# Patient Record
Sex: MALE | Race: BLACK OR AFRICAN AMERICAN | NOT HISPANIC OR LATINO | Employment: OTHER | URBAN - NONMETROPOLITAN AREA
[De-identification: names, ages, dates, MRNs, and addresses within clinical notes are randomized per-mention and may not be internally consistent; named-entity substitution may affect disease eponyms.]

---

## 2019-07-01 PROBLEM — R12 HEARTBURN: Status: ACTIVE | Noted: 2019-07-01

## 2019-07-01 PROBLEM — R79.89 ELEVATED LFTS: Status: ACTIVE | Noted: 2019-07-01

## 2019-07-01 PROBLEM — Z80.0 FAMILY HISTORY OF COLON CANCER: Status: ACTIVE | Noted: 2019-07-01

## 2019-07-18 PROBLEM — R10.9 ABDOMINAL PAIN: Status: ACTIVE | Noted: 2019-07-18

## 2021-10-25 DIAGNOSIS — R01.1 MURMUR, CARDIAC: Primary | ICD-10-CM

## 2021-10-26 DIAGNOSIS — R09.89 CAROTID BRUIT: Primary | ICD-10-CM

## 2021-10-27 ENCOUNTER — CLINICAL SUPPORT (OUTPATIENT)
Dept: CARDIOLOGY | Facility: HOSPITAL | Age: 66
End: 2021-10-27
Attending: NURSE PRACTITIONER
Payer: MEDICARE

## 2021-10-27 ENCOUNTER — HOSPITAL ENCOUNTER (OUTPATIENT)
Dept: RADIOLOGY | Facility: HOSPITAL | Age: 66
Discharge: HOME | End: 2021-10-27
Attending: NURSE PRACTITIONER
Payer: MEDICARE

## 2021-10-27 VITALS
SYSTOLIC BLOOD PRESSURE: 125 MMHG | BODY MASS INDEX: 24.5 KG/M2 | HEIGHT: 71 IN | WEIGHT: 175 LBS | DIASTOLIC BLOOD PRESSURE: 81 MMHG

## 2021-10-27 DIAGNOSIS — R09.89 CAROTID BRUIT: ICD-10-CM

## 2021-10-27 DIAGNOSIS — R01.1 MURMUR, CARDIAC: ICD-10-CM

## 2021-10-27 PROCEDURE — 93306 TTE W/DOPPLER COMPLETE: CPT

## 2021-10-27 PROCEDURE — 93880 EXTRACRANIAL BILAT STUDY: CPT | Mod: TC

## 2021-10-28 LAB
AORTIC ROOT ANNULUS: 2.38 CM
AORTIC VALVE CUSP SEPERATION: 1.48 CM
AV INDEX (PROSTH): 0.45
AV MEAN GRADIENT: 12 MMHG
AV PEAK GRADIENT: 21 MMHG
AV VALVE AREA: 1.51 CM2
AV VELOCITY RATIO: 0.38
BSA FOR ECHO PROCEDURE: 1.99 M2
CV ECHO LV RWT: 0.33 CM
DOP CALC AO PEAK VEL: 2.29 M/S
DOP CALC AO VTI: 43.01 CM
DOP CALC LVOT AREA: 3.4 CM2
DOP CALC LVOT DIAMETER: 2.08 CM
DOP CALC LVOT PEAK VEL: 0.86 M/S
DOP CALC LVOT STROKE VOLUME: 65.04 CM3
DOP CALCLVOT PEAK VEL VTI: 19.15 CM
E WAVE DECELERATION TIME: 186.24 MSEC
E/A RATIO: 1.1
ECHO LV POSTERIOR WALL: 0.96 CM (ref 0.6–1.1)
EJECTION FRACTION: 55 %
FRACTIONAL SHORTENING: 40 % (ref 28–44)
INTERVENTRICULAR SEPTUM: 0.95 CM (ref 0.6–1.1)
LEFT ATRIUM SIZE: 4.43 CM
LEFT INTERNAL DIMENSION IN SYSTOLE: 3.56 CM (ref 2.1–4)
LEFT VENTRICLE DIASTOLIC VOLUME INDEX: 86.65 ML/M2
LEFT VENTRICLE DIASTOLIC VOLUME: 172.44 ML
LEFT VENTRICLE MASS INDEX: 113 G/M2
LEFT VENTRICLE SYSTOLIC VOLUME INDEX: 26.7 ML/M2
LEFT VENTRICLE SYSTOLIC VOLUME: 53.13 ML
LEFT VENTRICULAR INTERNAL DIMENSION IN DIASTOLE: 5.89 CM (ref 3.5–6)
LEFT VENTRICULAR MASS: 225.42 G
MV PEAK A VEL: 1.04 M/S
MV PEAK E VEL: 1.14 M/S
MV STENOSIS PRESSURE HALF TIME: 54.01 MS
MV VALVE AREA P 1/2 METHOD: 4.07 CM2
PISA MRMAX VEL: 0.05 M/S
PISA TR MAX VEL: 2.47 M/S
PV PEAK VELOCITY: 0.65 CM/S
RA PRESSURE: 3 MMHG
RA WIDTH: 4 CM
RIGHT VENTRICULAR END-DIASTOLIC DIMENSION: 2.8 CM
TR MAX PG: 24 MMHG
TV REST PULMONARY ARTERY PRESSURE: 27 MMHG

## 2021-12-11 ENCOUNTER — HOSPITAL ENCOUNTER (INPATIENT)
Facility: HOSPITAL | Age: 66
LOS: 4 days | Discharge: HOME | DRG: 379 | End: 2021-12-15
Attending: EMERGENCY MEDICINE | Admitting: SURGERY
Payer: MEDICARE

## 2021-12-11 DIAGNOSIS — K57.90 DIVERTICULOSIS: ICD-10-CM

## 2021-12-11 DIAGNOSIS — K29.70 GASTRITIS, PRESENCE OF BLEEDING UNSPECIFIED, UNSPECIFIED CHRONICITY, UNSPECIFIED GASTRITIS TYPE: ICD-10-CM

## 2021-12-11 DIAGNOSIS — D64.9 ANEMIA, UNSPECIFIED TYPE: Primary | ICD-10-CM

## 2021-12-11 DIAGNOSIS — R53.1 WEAKNESS: ICD-10-CM

## 2021-12-11 DIAGNOSIS — D64.9 ANEMIA: ICD-10-CM

## 2021-12-11 DIAGNOSIS — R10.9 ABDOMINAL PAIN, UNSPECIFIED ABDOMINAL LOCATION: ICD-10-CM

## 2021-12-11 DIAGNOSIS — E87.6 HYPOKALEMIA: ICD-10-CM

## 2021-12-11 DIAGNOSIS — K31.7 GASTRIC POLYP: ICD-10-CM

## 2021-12-11 DIAGNOSIS — K92.2 GASTROINTESTINAL HEMORRHAGE, UNSPECIFIED GASTROINTESTINAL HEMORRHAGE TYPE: ICD-10-CM

## 2021-12-11 DIAGNOSIS — R01.1 MURMUR, HEART: ICD-10-CM

## 2021-12-11 LAB
ABO + RH BLD: NORMAL
ALBUMIN SERPL BCP-MCNC: 2.8 G/DL (ref 3.5–5.2)
ALP SERPL-CCNC: 39 U/L (ref 55–135)
ALT SERPL W/O P-5'-P-CCNC: 30 U/L (ref 10–44)
ANION GAP SERPL CALC-SCNC: 11 MMOL/L (ref 8–16)
APTT BLDCRRT: <21 SEC (ref 21–32)
AST SERPL-CCNC: 47 U/L (ref 10–40)
BASOPHILS # BLD AUTO: 0.01 K/UL (ref 0–0.2)
BASOPHILS NFR BLD: 0.2 % (ref 0–1.9)
BILIRUB SERPL-MCNC: 0.3 MG/DL (ref 0.1–1)
BILIRUB UR QL STRIP: NEGATIVE
BLD GP AB SCN CELLS X3 SERPL QL: NORMAL
BLD PROD TYP BPU: NORMAL
BLOOD UNIT EXPIRATION DATE: NORMAL
BLOOD UNIT TYPE CODE: 2800
BLOOD UNIT TYPE CODE: 9500
BLOOD UNIT TYPE: NORMAL
BUN SERPL-MCNC: 23 MG/DL (ref 8–23)
CALCIUM SERPL-MCNC: 8.5 MG/DL (ref 8.7–10.5)
CHLORIDE SERPL-SCNC: 101 MMOL/L (ref 95–110)
CLARITY UR: CLEAR
CO2 SERPL-SCNC: 29 MMOL/L (ref 23–29)
CODING SYSTEM: NORMAL
COLOR UR: YELLOW
CREAT SERPL-MCNC: 1.4 MG/DL (ref 0.5–1.4)
CTP QC/QA: YES
CTP QC/QA: YES
DIFFERENTIAL METHOD: ABNORMAL
DISPENSE STATUS: NORMAL
EOSINOPHIL # BLD AUTO: 0 K/UL (ref 0–0.5)
EOSINOPHIL NFR BLD: 0 % (ref 0–8)
ERYTHROCYTE [DISTWIDTH] IN BLOOD BY AUTOMATED COUNT: 14.6 % (ref 11.5–14.5)
EST. GFR  (AFRICAN AMERICAN): >60 ML/MIN/1.73 M^2
EST. GFR  (NON AFRICAN AMERICAN): 52.4 ML/MIN/1.73 M^2
GLUCOSE SERPL-MCNC: 140 MG/DL (ref 70–110)
GLUCOSE UR QL STRIP: NEGATIVE
HAPTOGLOB SERPL-MCNC: 106 MG/DL (ref 30–250)
HCT VFR BLD AUTO: 12.1 % (ref 40–54)
HCT VFR BLD AUTO: 20 % (ref 40–54)
HGB BLD-MCNC: 4.1 G/DL (ref 14–18)
HGB BLD-MCNC: 7 G/DL (ref 14–18)
HGB UR QL STRIP: NEGATIVE
IMM GRANULOCYTES # BLD AUTO: 0.01 K/UL (ref 0–0.04)
IMM GRANULOCYTES NFR BLD AUTO: 0.2 % (ref 0–0.5)
INR PPP: 1 (ref 0.8–1.2)
IRON SATN MFR SERPL: 7 % (ref 20–50)
IRON SERPL-MCNC: 27 UG/DL (ref 45–160)
KETONES UR QL STRIP: NEGATIVE
LACTATE SERPL-SCNC: 1.6 MMOL/L (ref 0.5–2.2)
LEUKOCYTE ESTERASE UR QL STRIP: NEGATIVE
LYMPHOCYTES # BLD AUTO: 0.9 K/UL (ref 1–4.8)
LYMPHOCYTES NFR BLD: 17.1 % (ref 18–48)
MAGNESIUM SERPL-MCNC: 1.7 MG/DL (ref 1.6–2.6)
MCH RBC QN AUTO: 29.3 PG (ref 27–31)
MCHC RBC AUTO-ENTMCNC: 33.9 G/DL (ref 32–36)
MCV RBC AUTO: 86 FL (ref 82–98)
MONOCYTES # BLD AUTO: 0.4 K/UL (ref 0.3–1)
MONOCYTES NFR BLD: 7.5 % (ref 4–15)
NEUTROPHILS # BLD AUTO: 3.8 K/UL (ref 1.8–7.7)
NEUTROPHILS NFR BLD: 75 % (ref 38–73)
NITRITE UR QL STRIP: NEGATIVE
NRBC BLD-RTO: 0 /100 WBC
NUM UNITS TRANS PACKED RBC: NORMAL
OB PNL STL: POSITIVE
PH UR STRIP: 8 [PH] (ref 5–8)
PLATELET # BLD AUTO: 206 K/UL (ref 150–450)
PMV BLD AUTO: 9.4 FL (ref 9.2–12.9)
POC MOLECULAR INFLUENZA A AGN: NEGATIVE
POC MOLECULAR INFLUENZA B AGN: NEGATIVE
POTASSIUM SERPL-SCNC: 2.6 MMOL/L (ref 3.5–5.1)
PROT SERPL-MCNC: 6.8 G/DL (ref 6–8.4)
PROT UR QL STRIP: NEGATIVE
PROTHROMBIN TIME: 10.8 SEC (ref 9–12.5)
RBC # BLD AUTO: 1.4 M/UL (ref 4.6–6.2)
RETICS/RBC NFR AUTO: 2.9 % (ref 0.4–2)
SARS-COV-2 RDRP RESP QL NAA+PROBE: NEGATIVE
SODIUM SERPL-SCNC: 141 MMOL/L (ref 136–145)
SP GR UR STRIP: 1.01 (ref 1–1.03)
TOTAL IRON BINDING CAPACITY: 367 UG/DL (ref 250–450)
TROPONIN I SERPL DL<=0.01 NG/ML-MCNC: 13.2 PG/ML (ref 0–60)
URN SPEC COLLECT METH UR: NORMAL
UROBILINOGEN UR STRIP-ACNC: NEGATIVE EU/DL
WBC # BLD AUTO: 5.04 K/UL (ref 3.9–12.7)

## 2021-12-11 PROCEDURE — 85014 HEMATOCRIT: CPT | Performed by: SURGERY

## 2021-12-11 PROCEDURE — U0002 COVID-19 LAB TEST NON-CDC: HCPCS | Performed by: EMERGENCY MEDICINE

## 2021-12-11 PROCEDURE — 25000003 PHARM REV CODE 250: Performed by: SURGERY

## 2021-12-11 PROCEDURE — 96374 THER/PROPH/DIAG INJ IV PUSH: CPT

## 2021-12-11 PROCEDURE — 86920 COMPATIBILITY TEST SPIN: CPT | Performed by: SURGERY

## 2021-12-11 PROCEDURE — 36415 COLL VENOUS BLD VENIPUNCTURE: CPT | Performed by: EMERGENCY MEDICINE

## 2021-12-11 PROCEDURE — 96361 HYDRATE IV INFUSION ADD-ON: CPT

## 2021-12-11 PROCEDURE — 25000003 PHARM REV CODE 250: Performed by: EMERGENCY MEDICINE

## 2021-12-11 PROCEDURE — 99291 CRITICAL CARE FIRST HOUR: CPT | Mod: 25

## 2021-12-11 PROCEDURE — 85018 HEMOGLOBIN: CPT | Performed by: SURGERY

## 2021-12-11 PROCEDURE — 85025 COMPLETE CBC W/AUTO DIFF WBC: CPT | Performed by: EMERGENCY MEDICINE

## 2021-12-11 PROCEDURE — 96375 TX/PRO/DX INJ NEW DRUG ADDON: CPT

## 2021-12-11 PROCEDURE — 82272 OCCULT BLD FECES 1-3 TESTS: CPT | Performed by: EMERGENCY MEDICINE

## 2021-12-11 PROCEDURE — 83735 ASSAY OF MAGNESIUM: CPT | Performed by: EMERGENCY MEDICINE

## 2021-12-11 PROCEDURE — 83540 ASSAY OF IRON: CPT | Performed by: SURGERY

## 2021-12-11 PROCEDURE — 83605 ASSAY OF LACTIC ACID: CPT | Performed by: EMERGENCY MEDICINE

## 2021-12-11 PROCEDURE — 84484 ASSAY OF TROPONIN QUANT: CPT | Performed by: EMERGENCY MEDICINE

## 2021-12-11 PROCEDURE — 36430 TRANSFUSION BLD/BLD COMPNT: CPT

## 2021-12-11 PROCEDURE — 86850 RBC ANTIBODY SCREEN: CPT | Performed by: EMERGENCY MEDICINE

## 2021-12-11 PROCEDURE — 93010 EKG 12-LEAD: ICD-10-PCS | Mod: ,,, | Performed by: INTERNAL MEDICINE

## 2021-12-11 PROCEDURE — 93010 ELECTROCARDIOGRAM REPORT: CPT | Mod: ,,, | Performed by: INTERNAL MEDICINE

## 2021-12-11 PROCEDURE — 88305 TISSUE EXAM BY PATHOLOGIST: CPT | Mod: TC

## 2021-12-11 PROCEDURE — 80053 COMPREHEN METABOLIC PANEL: CPT | Performed by: EMERGENCY MEDICINE

## 2021-12-11 PROCEDURE — 83010 ASSAY OF HAPTOGLOBIN QUANT: CPT | Performed by: SURGERY

## 2021-12-11 PROCEDURE — 11000001 HC ACUTE MED/SURG PRIVATE ROOM

## 2021-12-11 PROCEDURE — C9113 INJ PANTOPRAZOLE SODIUM, VIA: HCPCS | Performed by: SURGERY

## 2021-12-11 PROCEDURE — C9113 INJ PANTOPRAZOLE SODIUM, VIA: HCPCS | Performed by: EMERGENCY MEDICINE

## 2021-12-11 PROCEDURE — 63600175 PHARM REV CODE 636 W HCPCS: Performed by: SURGERY

## 2021-12-11 PROCEDURE — 63600175 PHARM REV CODE 636 W HCPCS: Performed by: EMERGENCY MEDICINE

## 2021-12-11 PROCEDURE — P9016 RBC LEUKOCYTES REDUCED: HCPCS | Performed by: EMERGENCY MEDICINE

## 2021-12-11 PROCEDURE — 81003 URINALYSIS AUTO W/O SCOPE: CPT | Performed by: EMERGENCY MEDICINE

## 2021-12-11 PROCEDURE — 85045 AUTOMATED RETICULOCYTE COUNT: CPT | Performed by: SURGERY

## 2021-12-11 PROCEDURE — 86920 COMPATIBILITY TEST SPIN: CPT | Performed by: EMERGENCY MEDICINE

## 2021-12-11 PROCEDURE — 36415 COLL VENOUS BLD VENIPUNCTURE: CPT | Performed by: SURGERY

## 2021-12-11 PROCEDURE — 93005 ELECTROCARDIOGRAM TRACING: CPT

## 2021-12-11 RX ORDER — POTASSIUM CHLORIDE 20 MEQ/1
40 TABLET, EXTENDED RELEASE ORAL
Status: COMPLETED | OUTPATIENT
Start: 2021-12-11 | End: 2021-12-11

## 2021-12-11 RX ORDER — MORPHINE SULFATE 2 MG/ML
2 INJECTION, SOLUTION INTRAMUSCULAR; INTRAVENOUS EVERY 4 HOURS PRN
Status: DISCONTINUED | OUTPATIENT
Start: 2021-12-11 | End: 2021-12-14

## 2021-12-11 RX ORDER — TALC
6 POWDER (GRAM) TOPICAL NIGHTLY PRN
Status: DISCONTINUED | OUTPATIENT
Start: 2021-12-11 | End: 2021-12-15 | Stop reason: HOSPADM

## 2021-12-11 RX ORDER — ATORVASTATIN CALCIUM 10 MG/1
10 TABLET, FILM COATED ORAL DAILY
COMMUNITY

## 2021-12-11 RX ORDER — ONDANSETRON 2 MG/ML
4 INJECTION INTRAMUSCULAR; INTRAVENOUS
Status: COMPLETED | OUTPATIENT
Start: 2021-12-11 | End: 2021-12-11

## 2021-12-11 RX ORDER — HYDROCODONE BITARTRATE AND ACETAMINOPHEN 500; 5 MG/1; MG/1
TABLET ORAL
Status: DISCONTINUED | OUTPATIENT
Start: 2021-12-11 | End: 2021-12-12

## 2021-12-11 RX ORDER — MORPHINE SULFATE 4 MG/ML
4 INJECTION, SOLUTION INTRAMUSCULAR; INTRAVENOUS EVERY 4 HOURS PRN
Status: DISCONTINUED | OUTPATIENT
Start: 2021-12-11 | End: 2021-12-14

## 2021-12-11 RX ORDER — ONDANSETRON 2 MG/ML
4 INJECTION INTRAMUSCULAR; INTRAVENOUS EVERY 8 HOURS PRN
Status: DISCONTINUED | OUTPATIENT
Start: 2021-12-11 | End: 2021-12-15 | Stop reason: HOSPADM

## 2021-12-11 RX ORDER — PANTOPRAZOLE SODIUM 40 MG/10ML
80 INJECTION, POWDER, LYOPHILIZED, FOR SOLUTION INTRAVENOUS
Status: COMPLETED | OUTPATIENT
Start: 2021-12-11 | End: 2021-12-11

## 2021-12-11 RX ORDER — SODIUM CHLORIDE 0.9 % (FLUSH) 0.9 %
10 SYRINGE (ML) INJECTION
Status: DISCONTINUED | OUTPATIENT
Start: 2021-12-11 | End: 2021-12-15 | Stop reason: HOSPADM

## 2021-12-11 RX ADMIN — POTASSIUM CHLORIDE 40 MEQ: 1500 TABLET, EXTENDED RELEASE ORAL at 08:12

## 2021-12-11 RX ADMIN — PANTOPRAZOLE SODIUM 8 MG/HR: 40 INJECTION, POWDER, FOR SOLUTION INTRAVENOUS at 10:12

## 2021-12-11 RX ADMIN — PANTOPRAZOLE SODIUM 8 MG/HR: 40 INJECTION, POWDER, FOR SOLUTION INTRAVENOUS at 07:12

## 2021-12-11 RX ADMIN — SODIUM CHLORIDE 1000 ML: 0.9 INJECTION, SOLUTION INTRAVENOUS at 08:12

## 2021-12-11 RX ADMIN — ONDANSETRON HYDROCHLORIDE 4 MG: 2 SOLUTION INTRAMUSCULAR; INTRAVENOUS at 08:12

## 2021-12-11 RX ADMIN — PANTOPRAZOLE SODIUM 8 MG/HR: 40 INJECTION, POWDER, FOR SOLUTION INTRAVENOUS at 09:12

## 2021-12-11 RX ADMIN — PANTOPRAZOLE SODIUM 80 MG: 40 INJECTION, POWDER, LYOPHILIZED, FOR SOLUTION INTRAVENOUS at 09:12

## 2021-12-11 NOTE — H&P
[facility]  General Surgery  History & Physical  12/11/21    Patient Name: Macho Diggs  MRN: [id number]  Admission Date: 12/11/2021  Attending Physician: Nona Tellez MD   Primary Care Provider: Seun Dodge NP    Patient information was obtained from patient, spouse/SO and ER records.     Subjective:     Chief Complaint/Reason for Admission:  Weakness and syncope    History of Present Illness:  Patient is a 66 y.o. male presents with weakness and what he feels is syncopal episode.  He reports for the last few weeks he has been feeling progressively more tired.  He was working outside and came inside to rest.  He went to the restroom and thinks he blacked out.  He denies any recent issues with bright red blood per rectum.  For the last few days, he has had dark stools.  He has never had an episode like this in the past.  He has had some reflux and indigestion issues in the past.  An upper endoscopy was unrevealing for any pathology per report.  He has some nausea with a couple episodes of emesis which was non bilious or bloody.  This was multiple years ago.  He also had a colonoscopy in approximately 2016 that was unremarkable.  He was evaluated by Cardiology in the past and home a.  And is currently undergoing a workup for murmur.  He has never been told he was anemia in the past.  No family history of sickle cell or any thalassemias.  No unintentional weight loss.  Family history of colon cancer in his brother and father.    No current facility-administered medications on file prior to encounter.     Current Outpatient Medications on File Prior to Encounter   Medication Sig    amlodipine (NORVASC) 10 MG tablet Take 1 tablet (10 mg total) by mouth once daily.    atorvastatin (LIPITOR) 10 MG tablet Take 10 mg by mouth once daily.    gabapentin (NEURONTIN) 300 MG capsule Take 1 capsule (300 mg total) by mouth 2 (two) times daily.    hydrochlorothiazide (HYDRODIURIL) 25 MG tablet Take 1 tablet  (25 mg total) by mouth once daily.    aspirin 81 MG Chew Take 81 mg by mouth once daily.    BYSTOLIC 10 mg Tab Take 10 mg by mouth once daily.    ciclopirox (LOPROX) 0.77 % Crea Apply a small amount to affected area twice a day PRN JOCK ITCH    desonide 0.05% (DESOWEN) 0.05 % Oint apply a small amount to affected area twice a day as needed for rash    hydrOXYzine HCl (ATARAX) 25 MG tablet Take 1 tablet (25 mg total) by mouth every evening. (Patient not taking: Reported on 2020)    meloxicam (MOBIC) 15 MG tablet Take 1 tablet (15 mg total) by mouth once daily. (Patient not taking: Reported on 2020)    pantoprazole (PROTONIX) 20 MG tablet Take 1 tablet (20 mg total) by mouth once daily. (Patient taking differently: Take 40 mg by mouth once daily. )    pravastatin (PRAVACHOL) 80 MG tablet Take 1 tablet (80 mg total) by mouth once daily. (Patient taking differently: Take 10 mg by mouth once daily. )       Review of patient's allergies indicates:  No Known Allergies    Past Medical History:   Diagnosis Date    Carpal tunnel syndrome     GERD (gastroesophageal reflux disease)     Heart murmur     Hyperlipidemia     Hypertension      Past Surgical History:   Procedure Laterality Date    COLONOSCOPY N/A 10/14/2016    Procedure: COLONOSCOPY;  Surgeon: Elizabet Jorgensen MD;  Location: [facility];  Service: Endoscopy;  Laterality: N/A;    ESOPHAGOGASTRODUODENOSCOPY N/A 2019    Procedure: ESOPHAGOGASTRODUODENOSCOPY (EGD);  Surgeon: Jocelyn Jorgensen MD;  Location: [facility];  Service: Endoscopy;  Laterality: N/A;    HERNIA REPAIR      TONSILLECTOMY       Family History     Problem Relation (Age of Onset)    Breast cancer Mother    Colon cancer Father, Brother        Tobacco Use    Smoking status: Former Smoker     Packs/day: 0.20     Years: 15.00     Pack years: 3.00     Types: Cigarettes     Start date: 10/12/1974     Quit date: 1985     Years since quittin.0    Smokeless  tobacco: Never Used   Substance and Sexual Activity    Alcohol use: Yes     Comment: 6*16 oz beers daily     Drug use: No    Sexual activity: Yes     Partners: Female     Review of Systems   Constitutional: Positive for activity change and fatigue. Negative for appetite change, chills, fever and unexpected weight change.   HENT: Negative for hearing loss, rhinorrhea, sinus pain, sneezing, sore throat and trouble swallowing.    Respiratory: Positive for shortness of breath. Negative for cough, choking, chest tightness, wheezing and stridor.    Cardiovascular: Negative for chest pain, palpitations and leg swelling.   Gastrointestinal: Positive for nausea and vomiting. Negative for abdominal pain, anal bleeding, blood in stool, constipation, diarrhea and rectal pain.   Genitourinary: Negative for difficulty urinating, flank pain and frequency.   Musculoskeletal: Positive for myalgias. Negative for arthralgias, gait problem and joint swelling.   Skin: Negative for color change, pallor and wound.   Neurological: Positive for dizziness, syncope, weakness and light-headedness. Negative for tremors, seizures, facial asymmetry, speech difficulty and numbness.   Psychiatric/Behavioral: Negative for agitation, behavioral problems and confusion. The patient is not nervous/anxious.    All other systems reviewed and are negative.    Objective:     Vital Signs (Most Recent):  Temp: 98.5 °F (36.9 °C) (12/11/21 1344)  Pulse: 81 (12/11/21 1344)  Resp: 20 (12/11/21 1344)  BP: (!) 157/90 (12/11/21 1344)  SpO2: 100 % (12/11/21 1344) Vital Signs (24h Range):  Temp:  [98.1 °F (36.7 °C)-98.5 °F (36.9 °C)] 98.5 °F (36.9 °C)  Pulse:  [67-87] 81  Resp:  [14-20] 20  SpO2:  [98 %-100 %] 100 %  BP: (111-157)/(66-90) 157/90     Weight: 74.8 kg (165 lb)  Body mass index is 23.01 kg/m².    Physical Exam  Vitals and nursing note reviewed.   Constitutional:       General: He is not in acute distress.     Appearance: Normal appearance. He is not  ill-appearing.   HENT:      Head: Normocephalic and atraumatic.      Nose: Nose normal.      Mouth/Throat:      Mouth: Mucous membranes are moist.      Pharynx: Oropharynx is clear.   Eyes:      General: No scleral icterus.     Extraocular Movements: Extraocular movements intact.      Conjunctiva/sclera: Conjunctivae normal.      Pupils: Pupils are equal, round, and reactive to light.   Cardiovascular:      Rate and Rhythm: Normal rate and regular rhythm.      Pulses: Normal pulses.      Heart sounds: Murmur (systolic) heard.   No friction rub. No gallop.    Pulmonary:      Effort: Pulmonary effort is normal. No respiratory distress.      Breath sounds: Normal breath sounds. No stridor. No wheezing, rhonchi or rales.   Abdominal:      General: Abdomen is flat. Bowel sounds are normal. There is no distension.      Palpations: Abdomen is soft.      Tenderness: There is no abdominal tenderness. There is no guarding or rebound.   Musculoskeletal:         General: No swelling or deformity. Normal range of motion.      Cervical back: Normal range of motion and neck supple.   Skin:     General: Skin is warm and dry.   Neurological:      General: No focal deficit present.      Mental Status: He is alert and oriented to person, place, and time. Mental status is at baseline.      Motor: No weakness.   Psychiatric:         Mood and Affect: Mood normal.         Behavior: Behavior normal.         Thought Content: Thought content normal.         Judgment: Judgment normal.         Significant Labs:  I have reviewed all pertinent lab results within the past 24 hours.  CBC:   Recent Labs   Lab 12/11/21  0806   WBC 5.04   RBC 1.40*   HGB 4.1*   HCT 12.1*      MCV 86   MCH 29.3   MCHC 33.9     BMP:   Recent Labs   Lab 12/11/21  0806   *      K 2.6*      CO2 29   BUN 23   CREATININE 1.4   CALCIUM 8.5*   MG 1.7     CMP:   Recent Labs   Lab 12/11/21  0806   *   CALCIUM 8.5*   ALBUMIN 2.8*   PROT 6.8   NA  141   K 2.6*   CO2 29      BUN 23   CREATININE 1.4   ALKPHOS 39*   ALT 30   AST 47*   BILITOT 0.3       Significant Diagnostics:  I have reviewed all pertinent imaging results/findings within the past 24 hours.    Assessment/Plan:     Active Diagnoses:    Diagnosis Date Noted POA    PRINCIPAL PROBLEM:  Anemia [D64.9] 12/11/2021 Yes      Transfuse  PPI drip  Cardiology consult  Endoscopy once medically stable     VTE Risk Mitigation (From admission, onward)         Ordered     IP VTE LOW RISK PATIENT  Once         12/11/21 1157     Place SHERRELL hose  Until discontinued         12/11/21 1157                Nona Tellez MD  General Surgery  [facility]

## 2021-12-11 NOTE — ED PROVIDER NOTES
"Encounter Date: 2021       History     Chief Complaint   Patient presents with    Weakness     Pt c/o weakness, "weak in my legs".  Pt states vomiting and throbbing in head     66-year-old male presents with weakness.  He states symptoms started yesterday.  Patient states that his legs feel weak is felt dizzy.  He has complained of nausea and a few episodes of vomiting.  She denies any diarrhea.  He denies any fever.  He denies any chest pain, cough, congestion, shortness of breath.  He does complain of a headache.  He denies similar episodes in the past.        Review of patient's allergies indicates:  No Known Allergies  Past Medical History:   Diagnosis Date    Carpal tunnel syndrome     GERD (gastroesophageal reflux disease)     Heart murmur     Hyperlipidemia     Hypertension      Past Surgical History:   Procedure Laterality Date    COLONOSCOPY N/A 10/14/2016    Procedure: COLONOSCOPY;  Surgeon: Elizabet Jorgensen MD;  [facility];  Service: Endoscopy;  Laterality: N/A;    ESOPHAGOGASTRODUODENOSCOPY N/A 2019    Procedure: ESOPHAGOGASTRODUODENOSCOPY (EGD);  Surgeon: Jocelyn Jorgensen MD;  [facility];  Service: Endoscopy;  Laterality: N/A;    HERNIA REPAIR      TONSILLECTOMY       Family History   Problem Relation Age of Onset    Breast cancer Mother     Colon cancer Father     Colon cancer Brother      Social History     Tobacco Use    Smoking status: Former Smoker     Packs/day: 0.20     Years: 11.00     Pack years: 2.20     Types: Cigarettes     Start date: 10/12/1974     Quit date: 1985     Years since quittin.0    Smokeless tobacco: Never Used   Substance Use Topics    Alcohol use: Yes     Comment: 6*16 oz beers daily     Drug use: No     Review of Systems   Gastrointestinal: Positive for nausea and vomiting.   Neurological: Positive for weakness and headaches.   All other systems reviewed and are negative.      Physical Exam     Initial Vitals " [12/11/21 0731]   BP Pulse Resp Temp SpO2   111/68 87 14 98.4 °F (36.9 °C) 100 %      MAP       --         Physical Exam    Nursing note and vitals reviewed.  Constitutional: He appears well-developed and well-nourished. No distress.   HENT:   Head: Atraumatic.   Mouth/Throat: Oropharynx is clear and moist.   Eyes: EOM are normal. Pupils are equal, round, and reactive to light.   Neck: Neck supple.   Cardiovascular: Normal rate, regular rhythm and normal heart sounds.   Pulmonary/Chest: Breath sounds normal. No respiratory distress.   Abdominal: Abdomen is soft. There is no abdominal tenderness.   Musculoskeletal:      Cervical back: Neck supple.     Neurological: He is alert and oriented to person, place, and time.   Skin: Skin is warm and dry.         ED Course   Critical Care    Date/Time: 12/11/2021 9:46 AM  Performed by: Damian Lucia MD  Authorized by: Damian Lucia MD   Direct patient critical care time: 10 minutes  Ordering / reviewing critical care time: 10 minutes  Documentation critical care time: 10 minutes  Consulting other physicians critical care time: 5 minutes  Total critical care time (exclusive of procedural time) : 35 minutes        Labs Reviewed   COMPREHENSIVE METABOLIC PANEL - Abnormal; Notable for the following components:       Result Value    Potassium 2.6 (*)     Glucose 140 (*)     Calcium 8.5 (*)     Albumin 2.8 (*)     Alkaline Phosphatase 39 (*)     AST 47 (*)     eGFR if non  52.4 (*)     All other components within normal limits    Narrative:     Called to and read back by Pam by YASMINE 12/11/2021 08:36   CBC W/ AUTO DIFFERENTIAL - Abnormal; Notable for the following components:    RBC 1.40 (*)     Hemoglobin 4.1 (*)     Hematocrit 12.1 (*)     RDW 14.6 (*)     Lymph # 0.9 (*)     Gran % 75.0 (*)     Lymph % 17.1 (*)     All other components within normal limits    Narrative:       HGB/HCT critical result(s) called and verbal readback obtained from   Enedina  Karl)  by SB3 12/11/2021 08:30   OCCULT BLOOD X 1, STOOL - Abnormal; Notable for the following components:    Occult Blood Positive (*)     All other components within normal limits   URINALYSIS, REFLEX TO URINE CULTURE    Narrative:     Preferred Collection Type->Urine, Clean Catch  Specimen Source->Urine   LACTIC ACID, PLASMA   MAGNESIUM   TROPONIN I HIGH SENSITIVITY   APTT   PROTIME-INR   SARS-COV-2 RDRP GENE    Narrative:     This test utilizes isothermal nucleic acid amplification   technology to detect the SARS-CoV-2 RdRp nucleic acid segment.   The analytical sensitivity (limit of detection) is 125 genome   equivalents/mL.   A POSITIVE result implies infection with the SARS-CoV-2 virus;   the patient is presumed to be contagious.     A NEGATIVE result means that SARS-CoV-2 nucleic acids are not   present above the limit of detection. A NEGATIVE result should be   treated as presumptive. It does not rule out the possibility of   COVID-19 and should not be the sole basis for treatment decisions.   If COVID-19 is strongly suspected based on clinical and exposure   history, re-testing using an alternate molecular assay should be   considered.   This test is only for use under the Food and Drug   Administration s Emergency Use Authorization (EUA).   Commercial kits are provided by [employer] [facility].   Performance characteristics of the EUA have been independently   verified by [facility] Department of   Pathology and Laboratory Medicine.   _________________________________________________________________   The authorized Fact Sheet for Healthcare Providers and the authorized Fact   Sheet for Patients of the ID NOW COVID-19 are available on the FDA   website:     https://www.fda.gov/media/942136/download  https://www.fda.gov/media/320933/download         POCT INFLUENZA A/B MOLECULAR   TYPE & SCREEN (MANUAL)   PREPARE RBC SOFT   PREPARE RBC SOFT          Imaging Results    None           Medications   0.9%  NaCl infusion (for blood administration) (has no administration in time range)   0.9%  NaCl infusion (for blood administration) (has no administration in time range)   sodium chloride 0.9% bolus 1,000 mL (0 mLs Intravenous Stopped 12/11/21 0900)   ondansetron injection 4 mg (4 mg Intravenous Given 12/11/21 0800)   potassium chloride SA CR tablet 40 mEq (40 mEq Oral Given 12/11/21 0854)   pantoprazole injection 80 mg (80 mg Intravenous Given 12/11/21 0946)   pantoprazole 40 mg in dextrose 5 % 100 mL infusion (ready to mix system) (8 mg/hr Intravenous New Bag 12/11/21 0946)     Medical Decision Making:   ED Management:  Afebrile vital signs stable.  Patient found to be severely anemic.  He does note that he has had dark stool for the past several days.  He had negative upper GI in 2019 and negative colonoscopy in 2016. He is being typed match 4 units of blood will be transfused.  He is being admitted to Dr. Tellez who was see the patient later today                       Clinical Impression:   Final diagnoses:  [R53.1] Weakness  [D64.9] Anemia, unspecified type (Primary)  [K92.2] Gastrointestinal hemorrhage, unspecified gastrointestinal hemorrhage type          ED Disposition Condition    Admit               Damian Lucia MD  12/11/21 0948

## 2021-12-11 NOTE — PLAN OF CARE
Problem: Adult Inpatient Plan of Care  Goal: Plan of Care Review  Outcome: Ongoing, Progressing  Goal: Patient-Specific Goal (Individualized)  Outcome: Ongoing, Progressing  Goal: Absence of Hospital-Acquired Illness or Injury  Outcome: Ongoing, Progressing  Goal: Optimal Comfort and Wellbeing  Outcome: Ongoing, Progressing  Goal: Readiness for Transition of Care  Outcome: Ongoing, Progressing   Will continue to monitor and will provide a safe environment

## 2021-12-11 NOTE — PLAN OF CARE
12/11/21 1351   Medicare Message   Important Message from Medicare regarding Discharge Appeal Rights Other (comments)  (Obtained by registration staff. No time documented.)   Date IMM was signed 12/11/21

## 2021-12-11 NOTE — PLAN OF CARE
Problem: Adult Inpatient Plan of Care  Goal: Plan of Care Review  Outcome: Ongoing, Progressing  Goal: Patient-Specific Goal (Individualized)  Outcome: Ongoing, Progressing  Goal: Absence of Hospital-Acquired Illness or Injury  Outcome: Ongoing, Progressing  Goal: Optimal Comfort and Wellbeing  Outcome: Ongoing, Progressing  Goal: Readiness for Transition of Care  Outcome: Ongoing, Progressing   Will continue to monitor and will maintain a safe environment

## 2021-12-11 NOTE — NURSING
Pt. Arrived at the hospital this morning due to weakness and dizzyness. Reports feeling like this since yesterday. History of hypertension and high cholesterol. Stool specimen was positive for occult blood. Pt was started on protonics in ER and blood was started due to a low Hemoglobin and hematocrit . Pt is currently getting a blood transfusion.. Pt has a 20 g in his right FA and a 18 g in the left FA. Pt is being admitted to Room 634 to the service of Dr. Singleton. Pt reports that he may have passed out yesterday ( states I was standing up and suddenly I found myself sitting down)   Will continue with this admission and will provide a safe environment. Fall precautions maintained. Pt was shown the location of his call bell and was instructed to buzz or call for any problems

## 2021-12-12 PROBLEM — R01.1 MURMUR, HEART: Status: ACTIVE | Noted: 2021-12-12

## 2021-12-12 PROBLEM — E87.6 HYPOKALEMIA: Status: ACTIVE | Noted: 2021-12-12

## 2021-12-12 LAB
ANION GAP SERPL CALC-SCNC: 8 MMOL/L (ref 8–16)
BASOPHILS # BLD AUTO: 0.01 K/UL (ref 0–0.2)
BASOPHILS NFR BLD: 0.2 % (ref 0–1.9)
BUN SERPL-MCNC: 18 MG/DL (ref 8–23)
CALCIUM SERPL-MCNC: 8.6 MG/DL (ref 8.7–10.5)
CHLORIDE SERPL-SCNC: 105 MMOL/L (ref 95–110)
CO2 SERPL-SCNC: 28 MMOL/L (ref 23–29)
CREAT SERPL-MCNC: 1.2 MG/DL (ref 0.5–1.4)
DIFFERENTIAL METHOD: ABNORMAL
EOSINOPHIL # BLD AUTO: 0 K/UL (ref 0–0.5)
EOSINOPHIL NFR BLD: 0.2 % (ref 0–8)
ERYTHROCYTE [DISTWIDTH] IN BLOOD BY AUTOMATED COUNT: 15.4 % (ref 11.5–14.5)
EST. GFR  (AFRICAN AMERICAN): >60 ML/MIN/1.73 M^2
EST. GFR  (NON AFRICAN AMERICAN): >60 ML/MIN/1.73 M^2
GLUCOSE SERPL-MCNC: 99 MG/DL (ref 70–110)
HCT VFR BLD AUTO: 24.7 % (ref 40–54)
HGB BLD-MCNC: 8.5 G/DL (ref 14–18)
IMM GRANULOCYTES # BLD AUTO: 0.01 K/UL (ref 0–0.04)
IMM GRANULOCYTES NFR BLD AUTO: 0.2 % (ref 0–0.5)
LYMPHOCYTES # BLD AUTO: 1.2 K/UL (ref 1–4.8)
LYMPHOCYTES NFR BLD: 19.1 % (ref 18–48)
MCH RBC QN AUTO: 30.5 PG (ref 27–31)
MCHC RBC AUTO-ENTMCNC: 34.4 G/DL (ref 32–36)
MCV RBC AUTO: 89 FL (ref 82–98)
MONOCYTES # BLD AUTO: 0.5 K/UL (ref 0.3–1)
MONOCYTES NFR BLD: 8.1 % (ref 4–15)
NEUTROPHILS # BLD AUTO: 4.5 K/UL (ref 1.8–7.7)
NEUTROPHILS NFR BLD: 72.2 % (ref 38–73)
NRBC BLD-RTO: 0 /100 WBC
PLATELET # BLD AUTO: 220 K/UL (ref 150–450)
PMV BLD AUTO: 9.7 FL (ref 9.2–12.9)
POTASSIUM SERPL-SCNC: 2.8 MMOL/L (ref 3.5–5.1)
RBC # BLD AUTO: 2.79 M/UL (ref 4.6–6.2)
SODIUM SERPL-SCNC: 141 MMOL/L (ref 136–145)
WBC # BLD AUTO: 6.18 K/UL (ref 3.9–12.7)

## 2021-12-12 PROCEDURE — 36430 TRANSFUSION BLD/BLD COMPNT: CPT

## 2021-12-12 PROCEDURE — 36415 COLL VENOUS BLD VENIPUNCTURE: CPT | Performed by: EMERGENCY MEDICINE

## 2021-12-12 PROCEDURE — 11000001 HC ACUTE MED/SURG PRIVATE ROOM

## 2021-12-12 PROCEDURE — 80048 BASIC METABOLIC PNL TOTAL CA: CPT | Performed by: EMERGENCY MEDICINE

## 2021-12-12 PROCEDURE — 25000003 PHARM REV CODE 250: Performed by: SURGERY

## 2021-12-12 PROCEDURE — 85025 COMPLETE CBC W/AUTO DIFF WBC: CPT | Performed by: EMERGENCY MEDICINE

## 2021-12-12 RX ORDER — LANOLIN ALCOHOL/MO/W.PET/CERES
400 CREAM (GRAM) TOPICAL ONCE
Status: COMPLETED | OUTPATIENT
Start: 2021-12-12 | End: 2021-12-12

## 2021-12-12 RX ORDER — SUCRALFATE 1 G/1
1 TABLET ORAL
Status: DISCONTINUED | OUTPATIENT
Start: 2021-12-12 | End: 2021-12-15 | Stop reason: HOSPADM

## 2021-12-12 RX ORDER — SODIUM, POTASSIUM,MAG SULFATES 17.5-3.13G
1 SOLUTION, RECONSTITUTED, ORAL ORAL ONCE
Status: DISCONTINUED | OUTPATIENT
Start: 2021-12-13 | End: 2021-12-12

## 2021-12-12 RX ORDER — POTASSIUM CHLORIDE 20 MEQ/1
40 TABLET, EXTENDED RELEASE ORAL 2 TIMES DAILY
Status: COMPLETED | OUTPATIENT
Start: 2021-12-12 | End: 2021-12-13

## 2021-12-12 RX ADMIN — POTASSIUM CHLORIDE 40 MEQ: 1500 TABLET, EXTENDED RELEASE ORAL at 09:12

## 2021-12-12 RX ADMIN — SUCRALFATE 1 G: 1 TABLET ORAL at 09:12

## 2021-12-12 RX ADMIN — SUCRALFATE 1 G: 1 TABLET ORAL at 05:12

## 2021-12-12 RX ADMIN — Medication 400 MG: at 05:12

## 2021-12-12 NOTE — [facility] SECONDARY REVIEW
Physician Advisor External    PA - Medical Necessity Issue    Approved Inpatient     Sent to [employer] for secondary review. Inpatient level of care approved.

## 2021-12-12 NOTE — PROGRESS NOTES
[facility]  General Surgery  Progress Note  12/12/21    Subjective:     History of Present Illness:  No notes on file    Post-Op Info:  Procedure(s) (LRB):  EGD (ESOPHAGOGASTRODUODENOSCOPY) (N/A)  COLONOSCOPY (N/A)         Interval History: No new issues.  Still dark stools.  No nausea or vomiting.  No abdominal pain.  No palpitations.      Medications:  Continuous Infusions:   pantoprozole (PROTONIX) IV infusion 8 mg/hr (12/11/21 2205)     Scheduled Meds:   [START ON 12/13/2021] sodium,potassium,mag sulfates  1 kit Oral Once    sucralfate  1 g Oral QID (AC & HS)     PRN Meds:melatonin, morphine, morphine, ondansetron, sodium chloride 0.9%     Review of patient's allergies indicates:  No Known Allergies  Objective:     Vital Signs (Most Recent):  Temp: 98.3 °F (36.8 °C) (12/12/21 1100)  Pulse: (P) 72 (12/12/21 1400)  Resp: 18 (12/12/21 1100)  BP: (!) 137/90 (12/12/21 1100)  SpO2: 99 % (12/12/21 1100) Vital Signs (24h Range):  Temp:  [97.9 °F (36.6 °C)-99 °F (37.2 °C)] 98.3 °F (36.8 °C)  Pulse:  [61-95] (P) 72  Resp:  [16-20] 18  SpO2:  [97 %-100 %] 99 %  BP: (112-156)/(65-90) 137/90     Weight: 74.8 kg (165 lb)  Body mass index is 23.01 kg/m².    Intake/Output - Last 3 Shifts       12/10 0700  12/11 0659 12/11 0700  12/12 0659 12/12 0700  12/13 0659    P.O.  300     I.V. (mL/kg)  400 (5.3)     Blood  1414     IV Piggyback  999     Total Intake(mL/kg)  3113 (41.6)     Urine (mL/kg/hr)  1000     Stool  0     Total Output  1000     Net  +2113            Stool Occurrence  2 x           Physical Exam  Vitals and nursing note reviewed.   Constitutional:       General: He is not in acute distress.     Appearance: Normal appearance. He is obese. He is not ill-appearing.   HENT:      Head: Normocephalic and atraumatic.      Nose: Nose normal.      Mouth/Throat:      Mouth: Mucous membranes are moist.      Pharynx: Oropharynx is clear.   Eyes:      General: No scleral icterus.     Extraocular Movements:  Extraocular movements intact.      Conjunctiva/sclera: Conjunctivae normal.      Pupils: Pupils are equal, round, and reactive to light.   Cardiovascular:      Rate and Rhythm: Normal rate and regular rhythm.      Pulses: Normal pulses.      Heart sounds: Normal heart sounds. No murmur heard.  No gallop.    Pulmonary:      Effort: Pulmonary effort is normal. No respiratory distress.      Breath sounds: Normal breath sounds. No stridor. No wheezing, rhonchi or rales.   Abdominal:      General: Abdomen is flat. Bowel sounds are normal. There is no distension.      Tenderness: There is no abdominal tenderness. There is no guarding.   Musculoskeletal:         General: No swelling. Normal range of motion.      Cervical back: Normal range of motion and neck supple.   Skin:     General: Skin is warm and dry.   Neurological:      General: No focal deficit present.      Mental Status: He is alert and oriented to person, place, and time. Mental status is at baseline.   Psychiatric:         Mood and Affect: Mood normal.         Behavior: Behavior normal.         Thought Content: Thought content normal.         Judgment: Judgment normal.         Significant Labs:  I have reviewed all pertinent lab results within the past 24 hours.  CBC:   Recent Labs   Lab 12/12/21  0538   WBC 6.18   RBC 2.79*   HGB 8.5*   HCT 24.7*      MCV 89   MCH 30.5   MCHC 34.4     BMP:   Recent Labs   Lab 12/11/21  0806 12/11/21  0806 12/12/21  0538   *   < > 99      < > 141   K 2.6*   < > 2.8*      < > 105   CO2 29   < > 28   BUN 23   < > 18   CREATININE 1.4   < > 1.2   CALCIUM 8.5*   < > 8.6*   MG 1.7  --   --     < > = values in this interval not displayed.     CMP:   Recent Labs   Lab 12/11/21  0806 12/11/21  0806 12/12/21  0538   *   < > 99   CALCIUM 8.5*   < > 8.6*   ALBUMIN 2.8*  --   --    PROT 6.8  --   --       < > 141   K 2.6*   < > 2.8*   CO2 29   < > 28      < > 105   BUN 23   < > 18   CREATININE  1.4   < > 1.2   ALKPHOS 39*  --   --    ALT 30  --   --    AST 47*  --   --    BILITOT 0.3  --   --     < > = values in this interval not displayed.       Significant Diagnostics:  I have reviewed all pertinent imaging results/findings within the past 24 hours.    Assessment/Plan:     * Anemia  Improved after transfusion.   Still melanotic stools?  Monitor H/H    Hypokalemia  Replace potassium    Murmur, heart  Awaiting cardiac eval        Nona Tellez MD  General Surgery  [facility]

## 2021-12-12 NOTE — PLAN OF CARE
[facility]  Initial Discharge Assessment       Primary Care Provider: Seun Dodeg NP    Admission Diagnosis: Weakness [R53.1]  Gastrointestinal hemorrhage, unspecified gastrointestinal hemorrhage type [K92.2]  Anemia, unspecified type [D64.9]    Admission Date: 12/11/2021  Expected Discharge Date:     Discharge Barriers Identified: None    Payor: [employer]ARE / Plan: [employer] / Product Type: Medicare Advantage /     Extended Emergency Contact Information  Primary Emergency Contact: Abbey Diggs   [place]  Mobile Phone: [phone]  Relation: Spouse    Discharge Plan A: Home with family  Discharge Plan B: Home with family      [facility] [address] [place], [place] [address]  [address]  [facility] [address]  Phone: [phone] Fax: [phone]    [facility] [address] [address]  Phone: [phone] Fax: [phone]    GIRISH SALCEDO [facility] [address]  [address]  [facility] [address]  Phone: [phone] Fax: [phone]      Initial Assessment (most recent)     Adult Discharge Assessment - 12/12/21 1134        Discharge Assessment    Assessment Type Discharge Planning Assessment     Confirmed/corrected address, phone number and insurance Yes     Confirmed Demographics Correct on Facesheet     Source of Information patient;family   Patient and spouse.    Communicated YOUSUF with patient/caregiver Date not available/Unable to determine     Reason For Admission Gi bleed     Lives With spouse     Do you expect to return to your current living situation? Yes     Do you have help at home or someone to help you manage your care at home? Yes     Who are your caregiver(s) and their phone number(s)? Patient is independent at home and does not need a caregiver but has support from spouse if needed.     Prior to hospitilization cognitive status:  Alert/Oriented     Current cognitive status: Alert/Oriented     Walking or Climbing Stairs Difficulty none     Dressing/Bathing Difficulty none     Home Accessibility stairs to enter home     Stairs Comment, Main Entrance No problems ambulating on stairs.     Equipment Currently Used at Home none     Readmission within 30 days? No     Patient currently being followed by outpatient case management? No     Do you currently have service(s) that help you manage your care at home? No     Do you take prescription medications? Yes     Do you have prescription coverage? Yes     Do you have any problems affording any of your prescribed medications? No     Is the patient taking medications as prescribed? yes     Who is going to help you get home at discharge? Spouse     How do you get to doctors appointments? car, drives self;family or friend will provide     Are you on dialysis? No     Discharge Plan A Home with family     Discharge Plan B Home with family     DME Needed Upon Discharge  none     Discharge Plan discussed with: Patient;Spouse/sig other     Discharge Barriers Identified None                      Spoke to the patient and spouse at bedside. Patient is independent at home. Still drives. No discharge needs.

## 2021-12-12 NOTE — NURSING
Pt noted to be alert and oriented, no distress. Verbally responsive and able to make needs known. Pt denies any pain, care plan reviewed. Will continue to monitor.

## 2021-12-12 NOTE — SUBJECTIVE & OBJECTIVE
Interval History: No new issues.  Still dark stools.  No nausea or vomiting.  No abdominal pain.  No palpitations.      Medications:  Continuous Infusions:   pantoprozole (PROTONIX) IV infusion 8 mg/hr (12/11/21 2205)     Scheduled Meds:   [START ON 12/13/2021] sodium,potassium,mag sulfates  1 kit Oral Once    sucralfate  1 g Oral QID (AC & HS)     PRN Meds:melatonin, morphine, morphine, ondansetron, sodium chloride 0.9%     Review of patient's allergies indicates:  No Known Allergies  Objective:     Vital Signs (Most Recent):  Temp: 98.3 °F (36.8 °C) (12/12/21 1100)  Pulse: (P) 72 (12/12/21 1400)  Resp: 18 (12/12/21 1100)  BP: (!) 137/90 (12/12/21 1100)  SpO2: 99 % (12/12/21 1100) Vital Signs (24h Range):  Temp:  [97.9 °F (36.6 °C)-99 °F (37.2 °C)] 98.3 °F (36.8 °C)  Pulse:  [61-95] (P) 72  Resp:  [16-20] 18  SpO2:  [97 %-100 %] 99 %  BP: (112-156)/(65-90) 137/90     Weight: 74.8 kg (165 lb)  Body mass index is 23.01 kg/m².    Intake/Output - Last 3 Shifts       12/10 0700  12/11 0659 12/11 0700  12/12 0659 12/12 0700  12/13 0659    P.O.  300     I.V. (mL/kg)  400 (5.3)     Blood  1414     IV Piggyback  999     Total Intake(mL/kg)  3113 (41.6)     Urine (mL/kg/hr)  1000     Stool  0     Total Output  1000     Net  +2113            Stool Occurrence  2 x           Physical Exam  Vitals and nursing note reviewed.   Constitutional:       General: He is not in acute distress.     Appearance: Normal appearance. He is obese. He is not ill-appearing.   HENT:      Head: Normocephalic and atraumatic.      Nose: Nose normal.      Mouth/Throat:      Mouth: Mucous membranes are moist.      Pharynx: Oropharynx is clear.   Eyes:      General: No scleral icterus.     Extraocular Movements: Extraocular movements intact.      Conjunctiva/sclera: Conjunctivae normal.      Pupils: Pupils are equal, round, and reactive to light.   Cardiovascular:      Rate and Rhythm: Normal rate and regular rhythm.      Pulses: Normal pulses.       Heart sounds: Normal heart sounds. No murmur heard.  No gallop.    Pulmonary:      Effort: Pulmonary effort is normal. No respiratory distress.      Breath sounds: Normal breath sounds. No stridor. No wheezing, rhonchi or rales.   Abdominal:      General: Abdomen is flat. Bowel sounds are normal. There is no distension.      Tenderness: There is no abdominal tenderness. There is no guarding.   Musculoskeletal:         General: No swelling. Normal range of motion.      Cervical back: Normal range of motion and neck supple.   Skin:     General: Skin is warm and dry.   Neurological:      General: No focal deficit present.      Mental Status: He is alert and oriented to person, place, and time. Mental status is at baseline.   Psychiatric:         Mood and Affect: Mood normal.         Behavior: Behavior normal.         Thought Content: Thought content normal.         Judgment: Judgment normal.         Significant Labs:  I have reviewed all pertinent lab results within the past 24 hours.  CBC:   Recent Labs   Lab 12/12/21  0538   WBC 6.18   RBC 2.79*   HGB 8.5*   HCT 24.7*      MCV 89   MCH 30.5   MCHC 34.4     BMP:   Recent Labs   Lab 12/11/21  0806 12/11/21  0806 12/12/21  0538   *   < > 99      < > 141   K 2.6*   < > 2.8*      < > 105   CO2 29   < > 28   BUN 23   < > 18   CREATININE 1.4   < > 1.2   CALCIUM 8.5*   < > 8.6*   MG 1.7  --   --     < > = values in this interval not displayed.     CMP:   Recent Labs   Lab 12/11/21  0806 12/11/21  0806 12/12/21  0538   *   < > 99   CALCIUM 8.5*   < > 8.6*   ALBUMIN 2.8*  --   --    PROT 6.8  --   --       < > 141   K 2.6*   < > 2.8*   CO2 29   < > 28      < > 105   BUN 23   < > 18   CREATININE 1.4   < > 1.2   ALKPHOS 39*  --   --    ALT 30  --   --    AST 47*  --   --    BILITOT 0.3  --   --     < > = values in this interval not displayed.       Significant Diagnostics:  I have reviewed all pertinent imaging results/findings within  the past 24 hours.

## 2021-12-13 ENCOUNTER — ANESTHESIA EVENT (OUTPATIENT)
Dept: ENDOSCOPY | Facility: HOSPITAL | Age: 66
DRG: 379 | End: 2021-12-13
Payer: MEDICARE

## 2021-12-13 LAB
ANION GAP SERPL CALC-SCNC: 4 MMOL/L (ref 8–16)
BASOPHILS # BLD AUTO: 0.02 K/UL (ref 0–0.2)
BASOPHILS NFR BLD: 0.4 % (ref 0–1.9)
BLD PROD TYP BPU: NORMAL
BLOOD UNIT EXPIRATION DATE: NORMAL
BLOOD UNIT TYPE CODE: 600
BLOOD UNIT TYPE: NORMAL
BUN SERPL-MCNC: 17 MG/DL (ref 8–23)
CALCIUM SERPL-MCNC: 8.7 MG/DL (ref 8.7–10.5)
CHLORIDE SERPL-SCNC: 108 MMOL/L (ref 95–110)
CO2 SERPL-SCNC: 29 MMOL/L (ref 23–29)
CODING SYSTEM: NORMAL
CREAT SERPL-MCNC: 1.2 MG/DL (ref 0.5–1.4)
DIFFERENTIAL METHOD: ABNORMAL
DISPENSE STATUS: NORMAL
EOSINOPHIL # BLD AUTO: 0.1 K/UL (ref 0–0.5)
EOSINOPHIL NFR BLD: 1.3 % (ref 0–8)
ERYTHROCYTE [DISTWIDTH] IN BLOOD BY AUTOMATED COUNT: 15.9 % (ref 11.5–14.5)
EST. GFR  (AFRICAN AMERICAN): >60 ML/MIN/1.73 M^2
EST. GFR  (NON AFRICAN AMERICAN): >60 ML/MIN/1.73 M^2
GLUCOSE SERPL-MCNC: 98 MG/DL (ref 70–110)
HCT VFR BLD AUTO: 22.6 % (ref 40–54)
HGB BLD-MCNC: 7.6 G/DL (ref 14–18)
IMM GRANULOCYTES # BLD AUTO: 0.01 K/UL (ref 0–0.04)
IMM GRANULOCYTES NFR BLD AUTO: 0.2 % (ref 0–0.5)
LYMPHOCYTES # BLD AUTO: 1.1 K/UL (ref 1–4.8)
LYMPHOCYTES NFR BLD: 20 % (ref 18–48)
MAGNESIUM SERPL-MCNC: 1.8 MG/DL (ref 1.6–2.6)
MCH RBC QN AUTO: 30 PG (ref 27–31)
MCHC RBC AUTO-ENTMCNC: 33.6 G/DL (ref 32–36)
MCV RBC AUTO: 89 FL (ref 82–98)
MONOCYTES # BLD AUTO: 0.5 K/UL (ref 0.3–1)
MONOCYTES NFR BLD: 9.7 % (ref 4–15)
NEUTROPHILS # BLD AUTO: 3.7 K/UL (ref 1.8–7.7)
NEUTROPHILS NFR BLD: 68.4 % (ref 38–73)
NRBC BLD-RTO: 0 /100 WBC
NUM UNITS TRANS PACKED RBC: NORMAL
PLATELET # BLD AUTO: 219 K/UL (ref 150–450)
PMV BLD AUTO: 9.3 FL (ref 9.2–12.9)
POTASSIUM SERPL-SCNC: 3.6 MMOL/L (ref 3.5–5.1)
RBC # BLD AUTO: 2.53 M/UL (ref 4.6–6.2)
SODIUM SERPL-SCNC: 141 MMOL/L (ref 136–145)
WBC # BLD AUTO: 5.35 K/UL (ref 3.9–12.7)

## 2021-12-13 PROCEDURE — 85025 COMPLETE CBC W/AUTO DIFF WBC: CPT | Performed by: SURGERY

## 2021-12-13 PROCEDURE — 80048 BASIC METABOLIC PNL TOTAL CA: CPT | Performed by: SURGERY

## 2021-12-13 PROCEDURE — P9016 RBC LEUKOCYTES REDUCED: HCPCS | Performed by: SURGERY

## 2021-12-13 PROCEDURE — 11000001 HC ACUTE MED/SURG PRIVATE ROOM

## 2021-12-13 PROCEDURE — 83735 ASSAY OF MAGNESIUM: CPT | Performed by: SURGERY

## 2021-12-13 PROCEDURE — C9113 INJ PANTOPRAZOLE SODIUM, VIA: HCPCS | Performed by: SURGERY

## 2021-12-13 PROCEDURE — 63600175 PHARM REV CODE 636 W HCPCS: Performed by: SURGERY

## 2021-12-13 PROCEDURE — 25000003 PHARM REV CODE 250: Performed by: SURGERY

## 2021-12-13 PROCEDURE — 36430 TRANSFUSION BLD/BLD COMPNT: CPT

## 2021-12-13 PROCEDURE — 36415 COLL VENOUS BLD VENIPUNCTURE: CPT | Performed by: SURGERY

## 2021-12-13 RX ORDER — SODIUM, POTASSIUM,MAG SULFATES 17.5-3.13G
1 SOLUTION, RECONSTITUTED, ORAL ORAL ONCE
Status: COMPLETED | OUTPATIENT
Start: 2021-12-13 | End: 2021-12-13

## 2021-12-13 RX ORDER — HYDROCODONE BITARTRATE AND ACETAMINOPHEN 500; 5 MG/1; MG/1
TABLET ORAL
Status: DISCONTINUED | OUTPATIENT
Start: 2021-12-13 | End: 2021-12-14

## 2021-12-13 RX ADMIN — PANTOPRAZOLE SODIUM 8 MG/HR: 40 INJECTION, POWDER, FOR SOLUTION INTRAVENOUS at 08:12

## 2021-12-13 RX ADMIN — PANTOPRAZOLE SODIUM 8 MG/HR: 40 INJECTION, POWDER, FOR SOLUTION INTRAVENOUS at 03:12

## 2021-12-13 RX ADMIN — PANTOPRAZOLE SODIUM 8 MG/HR: 40 INJECTION, POWDER, FOR SOLUTION INTRAVENOUS at 09:12

## 2021-12-13 RX ADMIN — SUCRALFATE 1 G: 1 TABLET ORAL at 03:12

## 2021-12-13 RX ADMIN — SUCRALFATE 1 G: 1 TABLET ORAL at 10:12

## 2021-12-13 RX ADMIN — POTASSIUM CHLORIDE 40 MEQ: 1500 TABLET, EXTENDED RELEASE ORAL at 08:12

## 2021-12-13 RX ADMIN — SUCRALFATE 1 G: 1 TABLET ORAL at 08:12

## 2021-12-13 RX ADMIN — SODIUM SULFATE, POTASSIUM SULFATE, MAGNESIUM SULFATE 354 ML: 17.5; 3.13; 1.6 SOLUTION, CONCENTRATE ORAL at 04:12

## 2021-12-13 RX ADMIN — SUCRALFATE 1 G: 1 TABLET ORAL at 06:12

## 2021-12-13 NOTE — PLAN OF CARE
Plan of care discussed with patient at bedside. Patient received 1 unit of blood with plan for colonoscopy tomorrow. Will continue protonix drip. Patient tolerating clear liquid. Will continue to monitor.

## 2021-12-13 NOTE — CONSULTS
[facility]  Cardiology  Consult Note    Patient Name: Macho Diggs  Patient : 1955  MRN: [id number]  Admission Date: 2021  Hospital Length of Stay: 2 days  Code Status: Full Code   Attending Provider: Nona Tellez MD   Consulting Provider: GENE Kessler  Primary Care Physician: Seun oDdge NP  Principal Problem:Anemia      Patient information was obtained from patient, past medical records and ER records.     Consults  Subjective:     Chief Complaint:   Weakness     HPI:     Patient is a 67 y/o M with a history of HTN, Hyperlipidemia, Murmur, GERD.    Presented to hospital for pre-syncopal event and weakness.  Noted to have significant anemia with melena and was admitted for further monitoring.  Denies any chest pain or shortness of breath at this time.  Pending EGD/colonoscopy tomorrow.  Needs cardiac clearance due to murmur.  Recent echo performed at [facility] without significant abnormality.      Past Medical History:   Diagnosis Date    Carpal tunnel syndrome     GERD (gastroesophageal reflux disease)     Heart murmur     Hyperlipidemia     Hypertension        Past Surgical History:   Procedure Laterality Date    COLONOSCOPY N/A 10/14/2016    Procedure: COLONOSCOPY;  Surgeon: Elizabet Jorgensen MD;  Location: [facility];  Service: Endoscopy;  Laterality: N/A;    ESOPHAGOGASTRODUODENOSCOPY N/A 2019    Procedure: ESOPHAGOGASTRODUODENOSCOPY (EGD);  Surgeon: Jocelyn Jorgensen MD;  Location: [facility];  Service: Endoscopy;  Laterality: N/A;    HERNIA REPAIR      TONSILLECTOMY         Review of patient's allergies indicates:  No Known Allergies    No current facility-administered medications on file prior to encounter.     Current Outpatient Medications on File Prior to Encounter   Medication Sig    amlodipine (NORVASC) 10 MG tablet Take 1 tablet (10 mg total) by mouth once daily.    atorvastatin (LIPITOR) 10 MG tablet Take 10 mg by mouth once daily.    gabapentin  (NEURONTIN) 300 MG capsule Take 1 capsule (300 mg total) by mouth 2 (two) times daily.    hydrochlorothiazide (HYDRODIURIL) 25 MG tablet Take 1 tablet (25 mg total) by mouth once daily.    aspirin 81 MG Chew Take 81 mg by mouth once daily.    BYSTOLIC 10 mg Tab Take 10 mg by mouth once daily.    ciclopirox (LOPROX) 0.77 % Crea Apply a small amount to affected area twice a day PRN JOCK ITCH    desonide 0.05% (DESOWEN) 0.05 % Oint apply a small amount to affected area twice a day as needed for rash    hydrOXYzine HCl (ATARAX) 25 MG tablet Take 1 tablet (25 mg total) by mouth every evening. (Patient not taking: Reported on 2020)    meloxicam (MOBIC) 15 MG tablet Take 1 tablet (15 mg total) by mouth once daily. (Patient not taking: Reported on 2020)    pantoprazole (PROTONIX) 20 MG tablet Take 1 tablet (20 mg total) by mouth once daily. (Patient taking differently: Take 40 mg by mouth once daily. )    pravastatin (PRAVACHOL) 80 MG tablet Take 1 tablet (80 mg total) by mouth once daily. (Patient taking differently: Take 10 mg by mouth once daily. )     Family History     Problem Relation (Age of Onset)    Breast cancer Mother    Colon cancer Father, Brother        Tobacco Use    Smoking status: Former Smoker     Packs/day: 0.20     Years: 15.00     Pack years: 3.00     Types: Cigarettes     Start date: 10/12/1974     Quit date: 1985     Years since quittin.0    Smokeless tobacco: Never Used   Substance and Sexual Activity    Alcohol use: Yes     Comment: 6*16 oz beers daily     Drug use: No    Sexual activity: Yes     Partners: Female     Review of Systems   Constitutional: Positive for activity change.   HENT: Negative.    Eyes: Negative.    Respiratory: Negative.    Cardiovascular: Negative.    Gastrointestinal: Negative.    Endocrine: Negative.    Genitourinary: Negative.    Musculoskeletal: Negative.    Skin: Positive for pallor.   Allergic/Immunologic: Negative.    Neurological:  Positive for weakness.   Hematological: Negative.    Psychiatric/Behavioral: Negative.      Objective:     Vital Signs (Most Recent):  Temp: 98.3 °F (36.8 °C) (12/13/21 0900)  Pulse: 82 (12/13/21 0900)  Resp: 20 (12/13/21 0900)  BP: (!) 148/108 (12/13/21 0900)  SpO2: 96 % (12/13/21 0900) Vital Signs (24h Range):  Temp:  [98 °F (36.7 °C)-98.6 °F (37 °C)] 98.3 °F (36.8 °C)  Pulse:  [72-83] 82  Resp:  [16-20] 20  SpO2:  [96 %-100 %] 96 %  BP: (129-148)/() 148/108     Weight: 74.8 kg (165 lb)  Body mass index is 23.01 kg/m².    SpO2: 96 %  O2 Device (Oxygen Therapy): room air      Intake/Output Summary (Last 24 hours) at 12/13/2021 1018  Last data filed at 12/13/2021 0600  Gross per 24 hour   Intake 1190 ml   Output --   Net 1190 ml       Lines/Drains/Airways     Peripheral Intravenous Line                 Peripheral IV - Single Lumen 12/11/21 0759 20 G Right Forearm 2 days         Peripheral IV - Single Lumen 12/11/21 0848 18 G Left Forearm 2 days                Physical Exam  Vitals and nursing note reviewed.   Constitutional:       Appearance: Normal appearance. He is not ill-appearing.   HENT:      Head: Normocephalic.      Nose: Nose normal.      Mouth/Throat:      Mouth: Mucous membranes are moist.   Eyes:      Pupils: Pupils are equal, round, and reactive to light.   Cardiovascular:      Rate and Rhythm: Normal rate and regular rhythm.      Heart sounds: Murmur heard.    Crescendo systolic murmur is present with a grade of 2/6.      Pulmonary:      Effort: Pulmonary effort is normal.      Breath sounds: Normal breath sounds.   Abdominal:      General: Abdomen is flat.   Musculoskeletal:         General: Normal range of motion.      Right lower leg: No edema.      Left lower leg: No edema.   Skin:     General: Skin is warm and dry.      Capillary Refill: Capillary refill takes less than 2 seconds.   Neurological:      General: No focal deficit present.      Mental Status: He is alert.   Psychiatric:          Mood and Affect: Mood normal.         Behavior: Behavior normal.         Significant Labs:  All pertinent lab results from the last 24 hours have been reviewed.   Recent Lab Results  (Last 5 results in the past 72 hours)      12/13/21  0436   12/12/21  0538   12/11/21  1752   12/11/21  1456   12/11/21  0908        Unit Blood Type Code               Unit Expiration               Unit Blood Type               Anion Gap 4   8             Appearance, UA         Clear       Baso # 0.02   0.01             Basophil % 0.4   0.2             Bilirubin (UA)         Negative       BUN 17   18             Calcium 8.7   8.6             Chloride 108   105             CO2 29   28             CODING SYSTEM               Color, UA         Yellow       Creatinine 1.2   1.2             Differential Method Automated   Automated             DISPENSE STATUS               eGFR if  >60.0   >60.0             eGFR if non  >60.0  Comment: Calculation used to obtain the estimated glomerular filtration  rate (eGFR) is the CKD-EPI equation.      >60.0  Comment: Calculation used to obtain the estimated glomerular filtration  rate (eGFR) is the CKD-EPI equation.                Eos # 0.1   0.0             Eosinophil % 1.3   0.2             Glucose 98   99             Glucose, UA         Negative       Gran # (ANC) 3.7   4.5             Gran % 68.4   72.2             Group & Rh               Haptoglobin       106         Hematocrit 22.6   24.7   20.0           Hemoglobin 7.6   8.5   7.0           Immature Grans (Abs) 0.01  Comment: Mild elevation in immature granulocytes is non specific and   can be seen in a variety of conditions including stress response,   acute inflammation, trauma and pregnancy. Correlation with other   laboratory and clinical findings is essential.     0.01  Comment: Mild elevation in immature granulocytes is non specific and   can be seen in a variety of conditions including stress response,    acute inflammation, trauma and pregnancy. Correlation with other   laboratory and clinical findings is essential.               Immature Granulocytes 0.2   0.2             Indirect Edith GEL               Iron       27         Iron Saturation       7         Ketones, UA         Negative       Leukocytes, UA         Negative       Lymph # 1.1   1.2             Lymph % 20.0   19.1             Magnesium 1.8               MCH 30.0   30.5             MCHC 33.6   34.4             MCV 89   89             Mono # 0.5   0.5             Mono % 9.7   8.1             MPV 9.3   9.7             NITRITE UA         Negative       nRBC 0   0             Occult Blood UA         Negative       pH, UA         8.0       Platelets 219   220             Potassium 3.6   2.8             Product Code               Protein, UA         Negative  Comment: Recommend a 24 hour urine protein or a urine   protein/creatinine ratio if globulin induced proteinuria is  clinically suspected.         RBC 2.53   2.79             RDW 15.9   15.4             Retic       2.9         Sodium 141   141             Specific [place], UA         1.010       Specimen UA         Urine, Clean Catch       TIBC       367         UNIT NUMBER               UROBILINOGEN UA         Negative       WBC 5.35   6.18                                  Significant Imaging:  Imaging Results    None                   ECHO: 10/27/2021  · The left ventricle is mildly enlarged with normal systolic function.  · The estimated ejection fraction is 55%.  · Normal left ventricular diastolic function.  · Normal right ventricular size.  · Mild right atrial enlargement.  · Mild mitral regurgitation.  · Mild tricuspid regurgitation.  · The estimated PA systolic pressure is 27 mmHg.  · Aortic valve area by planimetry measures 2.4 cm2.  · Mild left atrial enlargement.        ECG Sinus rhythm       LAST LIPIDS:  Lab Results   Component Value Date    CHOL 188 10/05/2016     Lab Results    Component Value Date    HDL 52 10/05/2016     Lab Results   Component Value Date    LDLCALC 110.6 10/05/2016     Lab Results   Component Value Date    TRIG 127 10/05/2016     Lab Results   Component Value Date    CHOLHDL 27.7 10/05/2016       MEDICATIONS:     Current Facility-Administered Medications:     0.9%  NaCl infusion (for blood administration), , Intravenous, Q24H PRN, Nona Tellez MD    melatonin tablet 6 mg, 6 mg, Oral, Nightly PRN, Damian Lucia MD    morphine injection 2 mg, 2 mg, Intravenous, Q4H PRN, Damian Lucia MD    morphine injection 4 mg, 4 mg, Intravenous, Q4H PRN, Damian Lucia MD    ondansetron injection 4 mg, 4 mg, Intravenous, Q8H PRN, Damian Lucia MD    pantoprazole 40 mg in dextrose 5 % 100 mL infusion (ready to mix system), 8 mg/hr, Intravenous, Continuous, Nona Tellez MD, Last Rate: 20 mL/hr at 12/13/21 0836, 8 mg/hr at 12/13/21 0836    sodium chloride 0.9% flush 10 mL, 10 mL, Intravenous, PRN, Damian Lucia MD    sodium,potassium,mag sulfates 17.5-3.13-1.6 gram SolR 354 mL, 1 kit, Oral, Once, Nona Tellez MD    sucralfate tablet 1 g, 1 g, Oral, QID (AC & HS), Nona Tellez MD, 1 g at 12/13/21 1010      Assessment and Plan:     Active Diagnoses:    Diagnosis Date Noted POA    PRINCIPAL PROBLEM:  Anemia [D64.9] 12/11/2021 Yes    Murmur, heart [R01.1] 12/12/2021 Yes    Hypokalemia [E87.6] 12/12/2021 Yes      Problems Resolved During this Admission:       VTE Risk Mitigation (From admission, onward)         Ordered     IP VTE LOW RISK PATIENT  Once         12/11/21 1157     Place SHERRELL hose  Until discontinued         12/11/21 1157                1.  Anemia with GI Bleed:  Pending EGD/colonoscopy tomorrow.  Remains anemic today.     2.  HTN:  BP stable overall.  Continue to follow.  Re-institute therapy once needed and anemia stabilizes     3.  Murmur:  Recent echo without significant pathology.     4.   Hyperlipidemia:  Stable.    Patient is undergoing a low risk surgical procedure and is low risk for cardiac complications.   Hold anti-platelet/NSAID medications prior to procedure  Tight hemodynamic control  DVT prevention per protocol     Thank you for your consult.          GENE Kessler  Cardiology  [facility] - [facility] S[facility]  12/13/2021

## 2021-12-13 NOTE — PLAN OF CARE
12/13/21 1400   Medicare Message   Important Message from Medicare regarding Discharge Appeal Rights Given to patient/caregiver;Explained to patient/caregiver;Signed/date by patient/caregiver   Date IMM was signed 12/13/21   Time IMM was signed [id number]

## 2021-12-14 ENCOUNTER — ANESTHESIA (OUTPATIENT)
Dept: ENDOSCOPY | Facility: HOSPITAL | Age: 66
DRG: 379 | End: 2021-12-14
Payer: MEDICARE

## 2021-12-14 PROBLEM — K57.90 DIVERTICULOSIS: Status: ACTIVE | Noted: 2021-12-14

## 2021-12-14 PROBLEM — R53.1 WEAKNESS: Status: ACTIVE | Noted: 2021-12-14

## 2021-12-14 PROBLEM — K29.70 GASTRITIS: Status: ACTIVE | Noted: 2021-12-14

## 2021-12-14 PROBLEM — K31.7 GASTRIC POLYP: Status: ACTIVE | Noted: 2021-12-14

## 2021-12-14 PROBLEM — K92.2 GASTROINTESTINAL HEMORRHAGE: Status: ACTIVE | Noted: 2021-12-14

## 2021-12-14 LAB
ANION GAP SERPL CALC-SCNC: 6 MMOL/L (ref 8–16)
BUN SERPL-MCNC: 11 MG/DL (ref 8–23)
CALCIUM SERPL-MCNC: 9.2 MG/DL (ref 8.7–10.5)
CHLORIDE SERPL-SCNC: 110 MMOL/L (ref 95–110)
CO2 SERPL-SCNC: 26 MMOL/L (ref 23–29)
CREAT SERPL-MCNC: 1.2 MG/DL (ref 0.5–1.4)
ERYTHROCYTE [DISTWIDTH] IN BLOOD BY AUTOMATED COUNT: 15.4 % (ref 11.5–14.5)
EST. GFR  (AFRICAN AMERICAN): >60 ML/MIN/1.73 M^2
EST. GFR  (NON AFRICAN AMERICAN): >60 ML/MIN/1.73 M^2
GLUCOSE SERPL-MCNC: 113 MG/DL (ref 70–110)
HCT VFR BLD AUTO: 27.5 % (ref 40–54)
HGB BLD-MCNC: 9.2 G/DL (ref 14–18)
MAGNESIUM SERPL-MCNC: 1.7 MG/DL (ref 1.6–2.6)
MCH RBC QN AUTO: 29.9 PG (ref 27–31)
MCHC RBC AUTO-ENTMCNC: 33.5 G/DL (ref 32–36)
MCV RBC AUTO: 89 FL (ref 82–98)
PLATELET # BLD AUTO: 261 K/UL (ref 150–450)
PMV BLD AUTO: 9.2 FL (ref 9.2–12.9)
POTASSIUM SERPL-SCNC: 3.6 MMOL/L (ref 3.5–5.1)
RBC # BLD AUTO: 3.08 M/UL (ref 4.6–6.2)
SODIUM SERPL-SCNC: 142 MMOL/L (ref 136–145)
WBC # BLD AUTO: 5.24 K/UL (ref 3.9–12.7)

## 2021-12-14 PROCEDURE — 11000001 HC ACUTE MED/SURG PRIVATE ROOM

## 2021-12-14 PROCEDURE — 27201423 OPTIME MED/SURG SUP & DEVICES STERILE SUPPLY: Performed by: SURGERY

## 2021-12-14 PROCEDURE — 43251 EGD REMOVE LESION SNARE: CPT | Performed by: SURGERY

## 2021-12-14 PROCEDURE — 80048 BASIC METABOLIC PNL TOTAL CA: CPT | Performed by: SURGERY

## 2021-12-14 PROCEDURE — 63600175 PHARM REV CODE 636 W HCPCS: Performed by: SURGERY

## 2021-12-14 PROCEDURE — C9113 INJ PANTOPRAZOLE SODIUM, VIA: HCPCS | Performed by: SURGERY

## 2021-12-14 PROCEDURE — 37000008 HC ANESTHESIA 1ST 15 MINUTES: Performed by: SURGERY

## 2021-12-14 PROCEDURE — 85027 COMPLETE CBC AUTOMATED: CPT | Performed by: SURGERY

## 2021-12-14 PROCEDURE — 25500020 PHARM REV CODE 255: Performed by: SURGERY

## 2021-12-14 PROCEDURE — 37000009 HC ANESTHESIA EA ADD 15 MINS: Performed by: SURGERY

## 2021-12-14 PROCEDURE — 25000003 PHARM REV CODE 250: Performed by: NURSE ANESTHETIST, CERTIFIED REGISTERED

## 2021-12-14 PROCEDURE — 25000003 PHARM REV CODE 250: Performed by: SURGERY

## 2021-12-14 PROCEDURE — 36415 COLL VENOUS BLD VENIPUNCTURE: CPT | Performed by: SURGERY

## 2021-12-14 PROCEDURE — 83735 ASSAY OF MAGNESIUM: CPT | Performed by: SURGERY

## 2021-12-14 RX ORDER — ATORVASTATIN CALCIUM 10 MG/1
10 TABLET, FILM COATED ORAL DAILY
Status: DISCONTINUED | OUTPATIENT
Start: 2021-12-15 | End: 2021-12-15 | Stop reason: HOSPADM

## 2021-12-14 RX ORDER — PROPOFOL 10 MG/ML
VIAL (ML) INTRAVENOUS
Status: DISCONTINUED | OUTPATIENT
Start: 2021-12-14 | End: 2021-12-14

## 2021-12-14 RX ORDER — AMLODIPINE BESYLATE 10 MG/1
10 TABLET ORAL DAILY
Status: DISCONTINUED | OUTPATIENT
Start: 2021-12-15 | End: 2021-12-15 | Stop reason: HOSPADM

## 2021-12-14 RX ORDER — HYDROCHLOROTHIAZIDE 25 MG/1
25 TABLET ORAL DAILY
Status: DISCONTINUED | OUTPATIENT
Start: 2021-12-15 | End: 2021-12-15 | Stop reason: HOSPADM

## 2021-12-14 RX ORDER — SODIUM CHLORIDE 9 MG/ML
INJECTION, SOLUTION INTRAVENOUS CONTINUOUS PRN
Status: DISCONTINUED | OUTPATIENT
Start: 2021-12-14 | End: 2021-12-14

## 2021-12-14 RX ORDER — GABAPENTIN 300 MG/1
300 CAPSULE ORAL 2 TIMES DAILY
Status: DISCONTINUED | OUTPATIENT
Start: 2021-12-14 | End: 2021-12-15 | Stop reason: HOSPADM

## 2021-12-14 RX ORDER — PANTOPRAZOLE SODIUM 40 MG/1
40 TABLET, DELAYED RELEASE ORAL DAILY
Status: DISCONTINUED | OUTPATIENT
Start: 2021-12-15 | End: 2021-12-15 | Stop reason: HOSPADM

## 2021-12-14 RX ADMIN — PANTOPRAZOLE SODIUM 8 MG/HR: 40 INJECTION, POWDER, FOR SOLUTION INTRAVENOUS at 08:12

## 2021-12-14 RX ADMIN — Medication 50 MG: at 02:12

## 2021-12-14 RX ADMIN — Medication 100 MG: at 02:12

## 2021-12-14 RX ADMIN — TOPICAL ANESTHETIC 2 EACH: 200 SPRAY DENTAL; PERIODONTAL at 02:12

## 2021-12-14 RX ADMIN — SODIUM CHLORIDE: 0.9 INJECTION, SOLUTION INTRAVENOUS at 02:12

## 2021-12-14 RX ADMIN — PANTOPRAZOLE SODIUM 8 MG/HR: 40 INJECTION, POWDER, FOR SOLUTION INTRAVENOUS at 02:12

## 2021-12-14 RX ADMIN — IOHEXOL 100 ML: 350 INJECTION, SOLUTION INTRAVENOUS at 04:12

## 2021-12-14 RX ADMIN — GABAPENTIN 300 MG: 300 CAPSULE ORAL at 08:12

## 2021-12-14 RX ADMIN — SUCRALFATE 1 G: 1 TABLET ORAL at 08:12

## 2021-12-14 NOTE — OP NOTE
[facility] Surg  EGD Procedure  Operative Note    SUMMARY     Date of Procedure: 12/14/2021     Procedure: Procedure(s) (LRB):  COLONOSCOPY (N/A)  EGD, WITH POLYPECTOMY USING SNARE (N/A)    Surgeon(s) and Role:     * Nona Tellez MD - Primary    Assisting Surgeon: None    Patient location: GI    Pre-Operative Diagnosis: Gastrointestinal hemorrhage, unspecified gastrointestinal hemorrhage type [K92.2]  Anemia [D64.9]    Post-Operative Diagnosis: Post-Op Diagnosis Codes:     * Gastrointestinal hemorrhage, unspecified gastrointestinal hemorrhage type [K92.2]     * Anemia [D64.9]  Gastritis  Gastric polyp  Diverticulosis     Indications:  Profound anemia          Procedure:                  The patient was brought in to the endoscopy suite where the risks, benefits, and alternatives of the procedure were described.  The patient was given the opportunity to ask questions and then signed informed consent. Patient was positioned in the left lateral decubitus position, continuous monitoring was initiated, and supplemental oxygen was provided via nasal cannula.  Bite block was placed.  Adequate sedation was achieved with the above mentioned medications and then titrated during the entire procedure.  Under direct visualization the gastroscope was introduced through the oropharynx in to the esophagus.  The scope was then advanced in to the stomach and second portion of the duodenum.  Scope was then withdrawn and the mucosa was carefully examined.  The entire gastric mucosa was examined, including the fundus with retroflexion.  Air was evacuated from the stomach and the scope was withdrawn in to the esophagus.  The entire esophageal mucosa was examined.  The patient tolerated the procedure well and was able to be transferred to the recovery area in stable condition.    Findings:                 Esophagus:  GE junction noted at 41 cm from the incisors.  No significant irregularity of the Z-line.                       Stomach:  Bile tinged fluid in the distal body and antrum.  Irritation noted within the antrum.  Biopsies taken for histopathology.  Small polypoid mass appreciated within the antrum.  Taken entirely with the snare and suctioned on the end of the scope for removal.                    Duodenum:  No significant mucosal abnormalities    Procedure:                  The patient was brought in to the endoscopy suite where the risks, benefits, and alternatives of the procedure were described.  The patient was given the opportunity to ask questions and then signed informed consent.  Patient was positioned in the left lateral decubitus position, continuous monitoring was initiated, and supplemental oxygen was provided via nasal cannula.  Adequate sedation was achieved with the above mentioned medications and then titrated during the entire procedure.  Digital rectal exam was performed.  Under direct visualization the colonoscope was introduced through the anus in to the rectum.  The scope was then advanced to the cecum, which was identified by the ileocecal valve and appendiceal orifice.  Scope was then withdrawn and the mucosa was carefully examined in a circular fashion.  The entire colonic mucosa was examined.  Air was evacuated from the colon and the procedure was terminated.  The patient tolerated the procedure well and was able to be transferred to the recovery area in stable condition.    Findings:                 Digital rectal examination:  No external abnormalities                     Rectum:  No significant hemorrhoidal disease                    Sigmoid:  Diffuse diverticular disease        Descending:  No mucosal abnormalities         Transverse:  No mucosal abnormalities         Ascending:  No mucosal abnormalities        Cecum:  Appendiceal orifice and ileocecal valve appreciated.  No mucosal abnormalities        Terminal Ileum:  Not intubated due to obscuring by a liquid stool     Specimens:   Specimen (24h  ago, onward)             Start     Ordered    12/14/21 1449  Specimen to Pathology, Surgery Gastrointestinal tract  Once        Comments: Pre-op Diagnosis: Gastrointestinal hemorrhage, unspecified gastrointestinal hemorrhage type [K92.2]  Anemia [D64.9]    Procedure(s):  COLONOSCOPY  EGD, WITH POLYPECTOMY USING SNARE     Number of specimens: 2    Name of specimens: bx antrum@1434, gastric polyp @1435   References:    Click here for ordering Quick Tip   Question Answer Comment   Procedure Type: Gastrointestinal tract    Specimen Class: Complex case/Special    Release to patient Immediate        12/14/21 1450                  Estimated Blood Loss (EBL): * No values recorded between 12/14/2021 12:00 AM and 12/14/2021  2:47 PM *     Complications: No     Diagnostic Impression:  Gastritis, gastric polyps, diverticulosis     Recommendations: Continue present medications. Await pathology results.    Disposition: Recovery unit stable     Attestation: I performed the procedure.        Follow Up:             No future appointments.        Nona Tellez MD  12/14/2021

## 2021-12-14 NOTE — PROGRESS NOTES
[facility]  General Surgery  Progress Note  12/13/21    Subjective:     History of Present Illness:  No notes on file    Post-Op Info:  Procedure(s) (LRB):  EGD (ESOPHAGOGASTRODUODENOSCOPY) (N/A)  COLONOSCOPY (N/A)         Interval History: No new issues.  Dark stool again this AM.  No nausea.  Tolerating clears.  Feels hungry.  No abdominal pain.      Medications:  Continuous Infusions:   pantoprozole (PROTONIX) IV infusion 8 mg/hr (12/13/21 2100)     Scheduled Meds:   sucralfate  1 g Oral QID (AC & HS)     PRN Meds:sodium chloride, melatonin, morphine, morphine, ondansetron, sodium chloride 0.9%     Review of patient's allergies indicates:  No Known Allergies  Objective:     Vital Signs (Most Recent):  Temp: 98 °F (36.7 °C) (12/13/21 2016)  Pulse: 71 (12/13/21 2016)  Resp: 18 (12/13/21 2016)  BP: 139/82 (12/13/21 2016)  SpO2: 100 % (12/13/21 2016) Vital Signs (24h Range):  Temp:  [98 °F (36.7 °C)-98.9 °F (37.2 °C)] 98 °F (36.7 °C)  Pulse:  [71-82] 71  Resp:  [12-20] 18  SpO2:  [96 %-100 %] 100 %  BP: (129-155)/() 139/82     Weight: 74.8 kg (165 lb)  Body mass index is 23.01 kg/m².    Intake/Output - Last 3 Shifts       12/12 0700  12/13 0659 12/13 0700  12/14 0659    P.O. 1190 570    I.V. (mL/kg)  104 (1.4)    Blood  699.5    IV Piggyback      Total Intake(mL/kg) 1190 (15.9) 1373.5 (18.4)    Urine (mL/kg/hr)      Stool      Total Output      Net +1190 +1373.5          Urine Occurrence 5 x 6 x    Stool Occurrence 4 x 1 x          Physical Exam  Vitals and nursing note reviewed.   Constitutional:       General: He is not in acute distress.     Appearance: Normal appearance. He is obese. He is not ill-appearing.   HENT:      Head: Normocephalic and atraumatic.      Nose: Nose normal.      Mouth/Throat:      Mouth: Mucous membranes are moist.      Pharynx: Oropharynx is clear.   Eyes:      General: No scleral icterus.     Extraocular Movements: Extraocular movements intact.      Conjunctiva/sclera:  Conjunctivae normal.      Pupils: Pupils are equal, round, and reactive to light.   Cardiovascular:      Rate and Rhythm: Normal rate and regular rhythm.      Pulses: Normal pulses.      Heart sounds: Normal heart sounds. No murmur heard.  No gallop.    Pulmonary:      Effort: Pulmonary effort is normal. No respiratory distress.      Breath sounds: Normal breath sounds. No stridor. No wheezing, rhonchi or rales.   Abdominal:      General: Abdomen is flat. Bowel sounds are normal. There is no distension.      Tenderness: There is no abdominal tenderness. There is no guarding.   Musculoskeletal:         General: No swelling. Normal range of motion.      Cervical back: Normal range of motion and neck supple.   Skin:     General: Skin is warm and dry.   Neurological:      General: No focal deficit present.      Mental Status: He is alert and oriented to person, place, and time. Mental status is at baseline.   Psychiatric:         Mood and Affect: Mood normal.         Behavior: Behavior normal.         Thought Content: Thought content normal.         Judgment: Judgment normal.         Significant Labs:  I have reviewed all pertinent lab results within the past 24 hours.  CBC:   Recent Labs   Lab 12/13/21  0436   WBC 5.35   RBC 2.53*   HGB 7.6*   HCT 22.6*      MCV 89   MCH 30.0   MCHC 33.6     BMP:   Recent Labs   Lab 12/13/21  0436   GLU 98      K 3.6      CO2 29   BUN 17   CREATININE 1.2   CALCIUM 8.7   MG 1.8     CMP:   Recent Labs   Lab 12/11/21  0806 12/12/21  0538 12/13/21  0436   *   < > 98   CALCIUM 8.5*   < > 8.7   ALBUMIN 2.8*  --   --    PROT 6.8  --   --       < > 141   K 2.6*   < > 3.6   CO2 29   < > 29      < > 108   BUN 23   < > 17   CREATININE 1.4   < > 1.2   ALKPHOS 39*  --   --    ALT 30  --   --    AST 47*  --   --    BILITOT 0.3  --   --     < > = values in this interval not displayed.       Significant Diagnostics:  I have reviewed all pertinent imaging  results/findings within the past 24 hours.    Assessment/Plan:     * Anemia  Transfusion today.   Still melanotic stools?  Monitor H/H  Endoscopy in AM    Hypokalemia  Replace potassium    Murmur, heart  Awaiting cardiac eval        Nona Tellez MD  General Surgery  [facility]

## 2021-12-14 NOTE — SUBJECTIVE & OBJECTIVE
Interval History: No new issues.  Tolerated prep    Medications:  Continuous Infusions:   pantoprozole (PROTONIX) IV infusion 8 mg/hr (12/14/21 0807)     Scheduled Meds:   sucralfate  1 g Oral QID (AC & HS)     PRN Meds:sodium chloride, benzocaine, melatonin, morphine, morphine, ondansetron, sodium chloride 0.9%     Review of patient's allergies indicates:  No Known Allergies  Objective:     Vital Signs (Most Recent):  Temp: 98.1 °F (36.7 °C) (12/14/21 1112)  Pulse: 71 (12/14/21 1112)  Resp: 18 (12/14/21 1112)  BP: 134/67 (12/14/21 1112)  SpO2: 99 % (12/14/21 1112) Vital Signs (24h Range):  Temp:  [98 °F (36.7 °C)-98.9 °F (37.2 °C)] 98.1 °F (36.7 °C)  Pulse:  [70-76] 71  Resp:  [1-18] 18  SpO2:  [98 %-100 %] 99 %  BP: (134-155)/(67-88) 134/67     Weight: 74.8 kg (165 lb)  Body mass index is 23.01 kg/m².    Intake/Output - Last 3 Shifts       12/12 0700  12/13 0659 12/13 0700  12/14 0659 12/14 0700  12/15 0659    P.O. 1190 1010     I.V. (mL/kg)  392 (5.2)     Blood  699.5     IV Piggyback       Total Intake(mL/kg) 1190 (15.9) 2101.5 (28.1)     Urine (mL/kg/hr)       Stool       Total Output       Net +1190 +2101.5            Urine Occurrence 5 x 9 x     Stool Occurrence 4 x 3 x           Physical Exam  Vitals and nursing note reviewed.   Constitutional:       General: He is not in acute distress.     Appearance: Normal appearance. He is obese. He is not ill-appearing.   HENT:      Head: Normocephalic and atraumatic.      Nose: Nose normal.      Mouth/Throat:      Mouth: Mucous membranes are moist.      Pharynx: Oropharynx is clear.   Eyes:      General: No scleral icterus.     Extraocular Movements: Extraocular movements intact.      Conjunctiva/sclera: Conjunctivae normal.      Pupils: Pupils are equal, round, and reactive to light.   Cardiovascular:      Rate and Rhythm: Normal rate and regular rhythm.      Pulses: Normal pulses.      Heart sounds: Normal heart sounds. No murmur heard.  No gallop.    Pulmonary:       Effort: Pulmonary effort is normal. No respiratory distress.      Breath sounds: Normal breath sounds. No stridor. No wheezing, rhonchi or rales.   Abdominal:      General: Abdomen is flat. Bowel sounds are normal. There is no distension.      Tenderness: There is no abdominal tenderness. There is no guarding.   Musculoskeletal:         General: No swelling. Normal range of motion.      Cervical back: Normal range of motion and neck supple.   Skin:     General: Skin is warm and dry.   Neurological:      General: No focal deficit present.      Mental Status: He is alert and oriented to person, place, and time. Mental status is at baseline.   Psychiatric:         Mood and Affect: Mood normal.         Behavior: Behavior normal.         Thought Content: Thought content normal.         Judgment: Judgment normal.         Significant Labs:  I have reviewed all pertinent lab results within the past 24 hours.  CBC:   Recent Labs   Lab 12/14/21  0805   WBC 5.24   RBC 3.08*   HGB 9.2*   HCT 27.5*      MCV 89   MCH 29.9   MCHC 33.5     BMP:   Recent Labs   Lab 12/14/21  0805   *      K 3.6      CO2 26   BUN 11   CREATININE 1.2   CALCIUM 9.2   MG 1.7     CMP:   Recent Labs   Lab 12/11/21  0806 12/12/21  0538 12/14/21  0805   *   < > 113*   CALCIUM 8.5*   < > 9.2   ALBUMIN 2.8*  --   --    PROT 6.8  --   --       < > 142   K 2.6*   < > 3.6   CO2 29   < > 26      < > 110   BUN 23   < > 11   CREATININE 1.4   < > 1.2   ALKPHOS 39*  --   --    ALT 30  --   --    AST 47*  --   --    BILITOT 0.3  --   --     < > = values in this interval not displayed.       Significant Diagnostics:  I have reviewed all pertinent imaging results/findings within the past 24 hours.

## 2021-12-14 NOTE — SUBJECTIVE & OBJECTIVE
Interval History: No new issues.  Dark stool again this AM.  No nausea.  Tolerating clears.  Feels hungry.  No abdominal pain.      Medications:  Continuous Infusions:   pantoprozole (PROTONIX) IV infusion 8 mg/hr (12/13/21 2100)     Scheduled Meds:   sucralfate  1 g Oral QID (AC & HS)     PRN Meds:sodium chloride, melatonin, morphine, morphine, ondansetron, sodium chloride 0.9%     Review of patient's allergies indicates:  No Known Allergies  Objective:     Vital Signs (Most Recent):  Temp: 98 °F (36.7 °C) (12/13/21 2016)  Pulse: 71 (12/13/21 2016)  Resp: 18 (12/13/21 2016)  BP: 139/82 (12/13/21 2016)  SpO2: 100 % (12/13/21 2016) Vital Signs (24h Range):  Temp:  [98 °F (36.7 °C)-98.9 °F (37.2 °C)] 98 °F (36.7 °C)  Pulse:  [71-82] 71  Resp:  [12-20] 18  SpO2:  [96 %-100 %] 100 %  BP: (129-155)/() 139/82     Weight: 74.8 kg (165 lb)  Body mass index is 23.01 kg/m².    Intake/Output - Last 3 Shifts       12/12 0700  12/13 0659 12/13 0700  12/14 0659    P.O. 1190 570    I.V. (mL/kg)  104 (1.4)    Blood  699.5    IV Piggyback      Total Intake(mL/kg) 1190 (15.9) 1373.5 (18.4)    Urine (mL/kg/hr)      Stool      Total Output      Net +1190 +1373.5          Urine Occurrence 5 x 6 x    Stool Occurrence 4 x 1 x          Physical Exam  Vitals and nursing note reviewed.   Constitutional:       General: He is not in acute distress.     Appearance: Normal appearance. He is obese. He is not ill-appearing.   HENT:      Head: Normocephalic and atraumatic.      Nose: Nose normal.      Mouth/Throat:      Mouth: Mucous membranes are moist.      Pharynx: Oropharynx is clear.   Eyes:      General: No scleral icterus.     Extraocular Movements: Extraocular movements intact.      Conjunctiva/sclera: Conjunctivae normal.      Pupils: Pupils are equal, round, and reactive to light.   Cardiovascular:      Rate and Rhythm: Normal rate and regular rhythm.      Pulses: Normal pulses.      Heart sounds: Normal heart sounds. No murmur  heard.  No gallop.    Pulmonary:      Effort: Pulmonary effort is normal. No respiratory distress.      Breath sounds: Normal breath sounds. No stridor. No wheezing, rhonchi or rales.   Abdominal:      General: Abdomen is flat. Bowel sounds are normal. There is no distension.      Tenderness: There is no abdominal tenderness. There is no guarding.   Musculoskeletal:         General: No swelling. Normal range of motion.      Cervical back: Normal range of motion and neck supple.   Skin:     General: Skin is warm and dry.   Neurological:      General: No focal deficit present.      Mental Status: He is alert and oriented to person, place, and time. Mental status is at baseline.   Psychiatric:         Mood and Affect: Mood normal.         Behavior: Behavior normal.         Thought Content: Thought content normal.         Judgment: Judgment normal.         Significant Labs:  I have reviewed all pertinent lab results within the past 24 hours.  CBC:   Recent Labs   Lab 12/13/21 0436   WBC 5.35   RBC 2.53*   HGB 7.6*   HCT 22.6*      MCV 89   MCH 30.0   MCHC 33.6     BMP:   Recent Labs   Lab 12/13/21 0436   GLU 98      K 3.6      CO2 29   BUN 17   CREATININE 1.2   CALCIUM 8.7   MG 1.8     CMP:   Recent Labs   Lab 12/11/21  0806 12/12/21  0538 12/13/21  0436   *   < > 98   CALCIUM 8.5*   < > 8.7   ALBUMIN 2.8*  --   --    PROT 6.8  --   --       < > 141   K 2.6*   < > 3.6   CO2 29   < > 29      < > 108   BUN 23   < > 17   CREATININE 1.4   < > 1.2   ALKPHOS 39*  --   --    ALT 30  --   --    AST 47*  --   --    BILITOT 0.3  --   --     < > = values in this interval not displayed.       Significant Diagnostics:  I have reviewed all pertinent imaging results/findings within the past 24 hours.

## 2021-12-14 NOTE — PLAN OF CARE
Pt reminded of NPO after MN, voices understanding. Prep completed. Voices feeling better after blood admin. No s/s of SE or AR from blood products. Pt voices previously going through this same process without a new diagnosis. Continue to observe.

## 2021-12-15 VITALS
WEIGHT: 165 LBS | OXYGEN SATURATION: 98 % | TEMPERATURE: 98 F | RESPIRATION RATE: 20 BRPM | SYSTOLIC BLOOD PRESSURE: 139 MMHG | BODY MASS INDEX: 23.1 KG/M2 | HEART RATE: 82 BPM | HEIGHT: 71 IN | DIASTOLIC BLOOD PRESSURE: 86 MMHG

## 2021-12-15 PROBLEM — K92.2 GASTROINTESTINAL HEMORRHAGE: Status: RESOLVED | Noted: 2021-12-14 | Resolved: 2021-12-15

## 2021-12-15 PROBLEM — R53.1 WEAKNESS: Status: RESOLVED | Noted: 2021-12-14 | Resolved: 2021-12-15

## 2021-12-15 PROCEDURE — 25000003 PHARM REV CODE 250: Performed by: SURGERY

## 2021-12-15 RX ORDER — SUCRALFATE 1 G/1
1 TABLET ORAL
Qty: 120 TABLET | Refills: 0 | Status: ON HOLD | OUTPATIENT
Start: 2021-12-15 | End: 2023-04-24 | Stop reason: CLARIF

## 2021-12-15 RX ORDER — PANTOPRAZOLE SODIUM 40 MG/1
40 TABLET, DELAYED RELEASE ORAL DAILY
Qty: 30 TABLET | Refills: 11 | Status: SHIPPED | OUTPATIENT
Start: 2021-12-16 | End: 2024-02-06

## 2021-12-15 RX ADMIN — AMLODIPINE BESYLATE 10 MG: 10 TABLET ORAL at 08:12

## 2021-12-15 RX ADMIN — SUCRALFATE 1 G: 1 TABLET ORAL at 06:12

## 2021-12-15 RX ADMIN — GABAPENTIN 300 MG: 300 CAPSULE ORAL at 08:12

## 2021-12-15 RX ADMIN — PANTOPRAZOLE SODIUM 40 MG: 40 TABLET, DELAYED RELEASE ORAL at 08:12

## 2021-12-15 RX ADMIN — HYDROCHLOROTHIAZIDE 25 MG: 25 TABLET ORAL at 08:12

## 2021-12-15 RX ADMIN — ATORVASTATIN CALCIUM 10 MG: 10 TABLET, FILM COATED ORAL at 08:12

## 2021-12-15 NOTE — PROGRESS NOTES
[facility]  General Surgery  Progress Note  12/15/21    Subjective:     History of Present Illness:  No notes on file    Post-Op Info:  Procedure(s) (LRB):  COLONOSCOPY (N/A)  EGD, WITH POLYPECTOMY USING SNARE (N/A)   1 Day Post-Op     Interval History: No new issues.  Tolerating diet.  No black or bloody stools.  No nausea.  No abdominal pain.    Medications:  Continuous Infusions:  Scheduled Meds:   amLODIPine  10 mg Oral Daily    atorvastatin  10 mg Oral Daily    gabapentin  300 mg Oral BID    hydroCHLOROthiazide  25 mg Oral Daily    pantoprazole  40 mg Oral Daily    sucralfate  1 g Oral QID (AC & HS)     PRN Meds:benzocaine, melatonin, ondansetron, sodium chloride 0.9%     Review of patient's allergies indicates:  No Known Allergies  Objective:     Vital Signs (Most Recent):  Temp: 97.5 °F (36.4 °C) (12/15/21 0744)  Pulse: 82 (12/15/21 0744)  Resp: 20 (12/15/21 0744)  BP: 139/86 (12/15/21 0820)  SpO2: 98 % (12/15/21 0744) Vital Signs (24h Range):  Temp:  [97.5 °F (36.4 °C)-98.1 °F (36.7 °C)] 97.5 °F (36.4 °C)  Pulse:  [] 82  Resp:  [16-20] 20  SpO2:  [98 %-100 %] 98 %  BP: (119-139)/(67-86) 139/86     Weight: 74.8 kg (165 lb)  Body mass index is 23.01 kg/m².    Intake/Output - Last 3 Shifts       12/13 0700  12/14 0659 12/14 0700  12/15 0659 12/15 0700 12/16 0659    P.O. 1010 400     I.V. (mL/kg) 392 (5.2) 750 (10)     Blood 699.5      Total Intake(mL/kg) 2101.5 (28.1) 1150 (15.4)     Urine (mL/kg/hr)  600 (0.3)     Stool  0     Total Output  600     Net +2101.5 +550            Urine Occurrence 9 x 5 x     Stool Occurrence 3 x 0 x           Physical Exam  Vitals and nursing note reviewed.   Constitutional:       General: He is not in acute distress.     Appearance: Normal appearance. He is obese. He is not ill-appearing.   HENT:      Head: Normocephalic and atraumatic.      Nose: Nose normal.      Mouth/Throat:      Mouth: Mucous membranes are moist.      Pharynx: Oropharynx is clear.    Eyes:      General: No scleral icterus.     Extraocular Movements: Extraocular movements intact.      Conjunctiva/sclera: Conjunctivae normal.      Pupils: Pupils are equal, round, and reactive to light.   Cardiovascular:      Rate and Rhythm: Normal rate and regular rhythm.      Pulses: Normal pulses.      Heart sounds: Normal heart sounds. No murmur heard.  No gallop.    Pulmonary:      Effort: Pulmonary effort is normal. No respiratory distress.      Breath sounds: Normal breath sounds. No stridor. No wheezing, rhonchi or rales.   Abdominal:      General: Abdomen is flat. Bowel sounds are normal. There is no distension.      Tenderness: There is no abdominal tenderness. There is no guarding.   Musculoskeletal:         General: No swelling. Normal range of motion.      Cervical back: Normal range of motion and neck supple.   Skin:     General: Skin is warm and dry.   Neurological:      General: No focal deficit present.      Mental Status: He is alert and oriented to person, place, and time. Mental status is at baseline.   Psychiatric:         Mood and Affect: Mood normal.         Behavior: Behavior normal.         Thought Content: Thought content normal.         Judgment: Judgment normal.         Significant Labs:  I have reviewed all pertinent lab results within the past 24 hours.  no new    Significant Diagnostics:  I have reviewed all pertinent imaging results/findings within the past 24 hours.CT reveals no SB pathology    Assessment/Plan:     * Anemia  ? multifactoial  H/H stable  Endoscopy unrevealing.  CT unrevealing  Iron replacement      Diverticulosis  Dietary precautions    Gastritis  Cont PPI  Await path    Gastric polyp  Await path    Gastrointestinal hemorrhage  Resolved   H/H stable yesterday  Endo unrevealing    Weakness  Resolved    Hypokalemia  Replaced potassium  Improved    Murmur, heart  Cardiac eval complete        Nona Tellez MD  General Surgery  [facility]

## 2021-12-15 NOTE — DISCHARGE SUMMARY
[facility]  General Surgery  Discharge Summary      Patient Name: Macho Diggs  MRN: [id number]  Admission Date: 12/11/2021  Hospital Length of Stay: 4 days  Discharge Date and Time:  12/15/2021 11:03 AM  Attending Physician: Nona Tellez MD   Discharging Provider: Nona Tellez MD  Primary Care Provider: Seun Dodge NP     HPI: Admitted for severe anemia    Procedure(s) (LRB):  COLONOSCOPY (N/A)  EGD, WITH POLYPECTOMY USING SNARE (N/A)     Hospital Course: Admitted for symptomatic anemia.  Noted to have a murmur.  Cardiology consulted.  Transfusion ordered and adequate response.  Endoscopy performed after colon prep.  Once H/H stable, he was discharged home.      Consults:   Consults (From admission, onward)          Status Ordering Provider     Inpatient consult to Cardiology  Once        Provider:  Constantine Bustamante MD    Completed NONA TELLEZ            Significant Diagnostic Studies: Labs: BMP: No results for input(s): GLU, NA, K, CL, CO2, BUN, CREATININE, CALCIUM, MG in the last 48 hours., CMP No results for input(s): NA, K, CL, CO2, GLU, BUN, CREATININE, CALCIUM, PROT, ALBUMIN, BILITOT, ALKPHOS, AST, ALT, ANIONGAP, ESTGFRAFRICA, EGFRNONAA in the last 48 hours., and CBC No results for input(s): WBC, HGB, HCT, PLT in the last 48 hours.  Endoscopy: Colonoscopy: see report and Gastroscopy: see report    Pending Diagnostic Studies:       Procedure Component Value Units Date/Time    Specimen to Pathology, Surgery Gastrointestinal tract [[id number]] Collected: 12/14/21 1450    Order Status: Sent Lab Status: In process Updated: 12/14/21 1512          Final Active Diagnoses:    Diagnosis Date Noted POA    PRINCIPAL PROBLEM:  Anemia [D64.9] 12/11/2021 Yes    Gastric polyp [K31.7] 12/14/2021 Yes    Gastritis [K29.70] 12/14/2021 Yes    Diverticulosis [K57.90] 12/14/2021 Yes    Murmur, heart [R01.1] 12/12/2021 Yes    Hypokalemia [E87.6] 12/12/2021 Yes      Problems Resolved During this  Admission:    Diagnosis Date Noted Date Resolved POA    Weakness [R53.1] 12/14/2021 12/15/2021 Yes    Gastrointestinal hemorrhage [K92.2] 12/14/2021 12/15/2021 Yes      Discharged Condition: good    Disposition: Home or Self Care    Follow Up:   Follow-up Information       Nona Tellez MD On 12/22/2021.    Specialty: General Surgery  Contact information:  [facility] [address] [place] [id number]  [phone]                           Patient Instructions:      Diet Adult Regular   Order Comments: Low residue.  Diverticular precautions     Notify your health care provider if you experience any of the following:  temperature >100.4     Notify your health care provider if you experience any of the following:  persistent nausea and vomiting or diarrhea     Notify your health care provider if you experience any of the following:  severe uncontrolled pain     Notify your health care provider if you experience any of the following:  difficulty breathing or increased cough     Notify your health care provider if you experience any of the following:  persistent dizziness, light-headedness, or visual disturbances     Notify your health care provider if you experience any of the following:  increased confusion or weakness     Activity as tolerated     Medications:  Reconciled Home Medications:      Medication List        START taking these medications      sucralfate 1 gram tablet  Commonly known as: CARAFATE  Take 1 tablet (1 g total) by mouth 4 (four) times daily before meals and nightly.            CHANGE how you take these medications      pantoprazole 40 MG tablet  Commonly known as: PROTONIX  Take 1 tablet (40 mg total) by mouth once daily.  What changed:   medication strength  how much to take            CONTINUE taking these medications      amLODIPine 10 MG tablet  Commonly known as: NORVASC  Take 1 tablet (10 mg total) by mouth once daily.     atorvastatin 10 MG tablet  Commonly known as:  LIPITOR  Take 10 mg by mouth once daily.     BYSTOLIC 10 MG Tab  Generic drug: nebivoloL  Take 10 mg by mouth once daily.     ciclopirox 0.77 % Crea  Commonly known as: LOPROX  Apply a small amount to affected area twice a day PRN JOCK ITCH     desonide 0.05% 0.05 % Oint  Commonly known as: DESOWEN  apply a small amount to affected area twice a day as needed for rash     gabapentin 300 MG capsule  Commonly known as: NEURONTIN  Take 1 capsule (300 mg total) by mouth 2 (two) times daily.     hydroCHLOROthiazide 25 MG tablet  Commonly known as: HYDRODIURIL  Take 1 tablet (25 mg total) by mouth once daily.            STOP taking these medications      aspirin 81 MG Chew     hydrOXYzine HCL 25 MG tablet  Commonly known as: ATARAX     meloxicam 15 MG tablet  Commonly known as: MOBIC     pravastatin 80 MG tablet  Commonly known as: PRAVACHOL              Nona Tellez MD  General Surgery  [facility]

## 2021-12-15 NOTE — PLAN OF CARE
[facility]  Discharge Final Note    Primary Care Provider: Seun Dodge NP    Expected Discharge Date: 12/15/2021    Final Discharge Note (most recent)     Final Note - 12/15/21 1450        Final Note    Assessment Type Final Discharge Note     Anticipated Discharge Disposition Home or Self Care        Post-Acute Status    Discharge Delays None known at this time                 Important Message from Medicare  Important Message from Medicare regarding Discharge Appeal Rights: Given to patient/caregiver,Explained to patient/caregiver,Signed/date by patient/caregiver     Date IMM was signed: 12/13/21  Time IMM was signed: 1342    Contact Info     Nona Tellez MD   Specialty: General Surgery    [address] [facility] [address] [place] [id number]   Phone: [phone]       Next Steps: Follow up on 12/22/2021

## 2021-12-15 NOTE — NURSING
1140   DISCHARGE INSTRUCTIONS GIVEN TO PATIENT.  SALINE-LOCK D/C'D WITH CATHETER INTACT.  TELEMETRY D/C'D AS WELL.  VOICED COMPLETE UNDERSTANDING OF ALL INSTRUCTIONS.  PATIENT LEFT FLOOR IN STABLE CONDITION PER WHEEL CHAIR.  SAFETY MAINTAINED.   ACE MORSE LPN

## 2021-12-15 NOTE — SUBJECTIVE & OBJECTIVE
Interval History: No new issues.  Tolerating diet.  No black or bloody stools.  No nausea.  No abdominal pain.    Medications:  Continuous Infusions:  Scheduled Meds:   amLODIPine  10 mg Oral Daily    atorvastatin  10 mg Oral Daily    gabapentin  300 mg Oral BID    hydroCHLOROthiazide  25 mg Oral Daily    pantoprazole  40 mg Oral Daily    sucralfate  1 g Oral QID (AC & HS)     PRN Meds:benzocaine, melatonin, ondansetron, sodium chloride 0.9%     Review of patient's allergies indicates:  No Known Allergies  Objective:     Vital Signs (Most Recent):  Temp: 97.5 °F (36.4 °C) (12/15/21 0744)  Pulse: 82 (12/15/21 0744)  Resp: 20 (12/15/21 0744)  BP: 139/86 (12/15/21 0820)  SpO2: 98 % (12/15/21 0744) Vital Signs (24h Range):  Temp:  [97.5 °F (36.4 °C)-98.1 °F (36.7 °C)] 97.5 °F (36.4 °C)  Pulse:  [] 82  Resp:  [16-20] 20  SpO2:  [98 %-100 %] 98 %  BP: (119-139)/(67-86) 139/86     Weight: 74.8 kg (165 lb)  Body mass index is 23.01 kg/m².    Intake/Output - Last 3 Shifts       12/13 0700  12/14 0659 12/14 0700  12/15 0659 12/15 0700  12/16 0659    P.O. 1010 400     I.V. (mL/kg) 392 (5.2) 750 (10)     Blood 699.5      Total Intake(mL/kg) 2101.5 (28.1) 1150 (15.4)     Urine (mL/kg/hr)  600 (0.3)     Stool  0     Total Output  600     Net +2101.5 +550            Urine Occurrence 9 x 5 x     Stool Occurrence 3 x 0 x           Physical Exam  Vitals and nursing note reviewed.   Constitutional:       General: He is not in acute distress.     Appearance: Normal appearance. He is obese. He is not ill-appearing.   HENT:      Head: Normocephalic and atraumatic.      Nose: Nose normal.      Mouth/Throat:      Mouth: Mucous membranes are moist.      Pharynx: Oropharynx is clear.   Eyes:      General: No scleral icterus.     Extraocular Movements: Extraocular movements intact.      Conjunctiva/sclera: Conjunctivae normal.      Pupils: Pupils are equal, round, and reactive to light.   Cardiovascular:      Rate and Rhythm:  Normal rate and regular rhythm.      Pulses: Normal pulses.      Heart sounds: Normal heart sounds. No murmur heard.  No gallop.    Pulmonary:      Effort: Pulmonary effort is normal. No respiratory distress.      Breath sounds: Normal breath sounds. No stridor. No wheezing, rhonchi or rales.   Abdominal:      General: Abdomen is flat. Bowel sounds are normal. There is no distension.      Tenderness: There is no abdominal tenderness. There is no guarding.   Musculoskeletal:         General: No swelling. Normal range of motion.      Cervical back: Normal range of motion and neck supple.   Skin:     General: Skin is warm and dry.   Neurological:      General: No focal deficit present.      Mental Status: He is alert and oriented to person, place, and time. Mental status is at baseline.   Psychiatric:         Mood and Affect: Mood normal.         Behavior: Behavior normal.         Thought Content: Thought content normal.         Judgment: Judgment normal.         Significant Labs:  I have reviewed all pertinent lab results within the past 24 hours.  no new    Significant Diagnostics:  I have reviewed all pertinent imaging results/findings within the past 24 hours.CT reveals no SB pathology

## 2021-12-15 NOTE — PROGRESS NOTES
[facility]  General Surgery  Progress Note  12/14/21    Subjective:     History of Present Illness:  No notes on file    Post-Op Info:  Procedure(s) (LRB):  COLONOSCOPY (N/A)  EGD, WITH POLYPECTOMY USING SNARE (N/A)   1 Day Post-Op     Interval History: No new issues.  Tolerated prep    Medications:  Continuous Infusions:   pantoprozole (PROTONIX) IV infusion 8 mg/hr (12/14/21 0807)     Scheduled Meds:   sucralfate  1 g Oral QID (AC & HS)     PRN Meds:sodium chloride, benzocaine, melatonin, morphine, morphine, ondansetron, sodium chloride 0.9%     Review of patient's allergies indicates:  No Known Allergies  Objective:     Vital Signs (Most Recent):  Temp: 98.1 °F (36.7 °C) (12/14/21 1112)  Pulse: 71 (12/14/21 1112)  Resp: 18 (12/14/21 1112)  BP: 134/67 (12/14/21 1112)  SpO2: 99 % (12/14/21 1112) Vital Signs (24h Range):  Temp:  [98 °F (36.7 °C)-98.9 °F (37.2 °C)] 98.1 °F (36.7 °C)  Pulse:  [70-76] 71  Resp:  [1-18] 18  SpO2:  [98 %-100 %] 99 %  BP: (134-155)/(67-88) 134/67     Weight: 74.8 kg (165 lb)  Body mass index is 23.01 kg/m².    Intake/Output - Last 3 Shifts       12/12 0700  12/13 0659 12/13 0700  12/14 0659 12/14 0700  12/15 0659    P.O. 1190 1010     I.V. (mL/kg)  392 (5.2)     Blood  699.5     IV Piggyback       Total Intake(mL/kg) 1190 (15.9) 2101.5 (28.1)     Urine (mL/kg/hr)       Stool       Total Output       Net +1190 +2101.5            Urine Occurrence 5 x 9 x     Stool Occurrence 4 x 3 x           Physical Exam  Vitals and nursing note reviewed.   Constitutional:       General: He is not in acute distress.     Appearance: Normal appearance. He is obese. He is not ill-appearing.   HENT:      Head: Normocephalic and atraumatic.      Nose: Nose normal.      Mouth/Throat:      Mouth: Mucous membranes are moist.      Pharynx: Oropharynx is clear.   Eyes:      General: No scleral icterus.     Extraocular Movements: Extraocular movements intact.      Conjunctiva/sclera: Conjunctivae normal.       Pupils: Pupils are equal, round, and reactive to light.   Cardiovascular:      Rate and Rhythm: Normal rate and regular rhythm.      Pulses: Normal pulses.      Heart sounds: Normal heart sounds. No murmur heard.  No gallop.    Pulmonary:      Effort: Pulmonary effort is normal. No respiratory distress.      Breath sounds: Normal breath sounds. No stridor. No wheezing, rhonchi or rales.   Abdominal:      General: Abdomen is flat. Bowel sounds are normal. There is no distension.      Tenderness: There is no abdominal tenderness. There is no guarding.   Musculoskeletal:         General: No swelling. Normal range of motion.      Cervical back: Normal range of motion and neck supple.   Skin:     General: Skin is warm and dry.   Neurological:      General: No focal deficit present.      Mental Status: He is alert and oriented to person, place, and time. Mental status is at baseline.   Psychiatric:         Mood and Affect: Mood normal.         Behavior: Behavior normal.         Thought Content: Thought content normal.         Judgment: Judgment normal.         Significant Labs:  I have reviewed all pertinent lab results within the past 24 hours.  CBC:   Recent Labs   Lab 12/14/21  0805   WBC 5.24   RBC 3.08*   HGB 9.2*   HCT 27.5*      MCV 89   MCH 29.9   MCHC 33.5     BMP:   Recent Labs   Lab 12/14/21  0805   *      K 3.6      CO2 26   BUN 11   CREATININE 1.2   CALCIUM 9.2   MG 1.7     CMP:   Recent Labs   Lab 12/11/21  0806 12/12/21  0538 12/14/21  0805   *   < > 113*   CALCIUM 8.5*   < > 9.2   ALBUMIN 2.8*  --   --    PROT 6.8  --   --       < > 142   K 2.6*   < > 3.6   CO2 29   < > 26      < > 110   BUN 23   < > 11   CREATININE 1.4   < > 1.2   ALKPHOS 39*  --   --    ALT 30  --   --    AST 47*  --   --    BILITOT 0.3  --   --     < > = values in this interval not displayed.       Significant Diagnostics:  I have reviewed all pertinent imaging results/findings within  the past 24 hours.    Assessment/Plan:     * Anemia  ? multifactoial  H/H stable  Endoscopy unrevealing      Gastritis  Cont PPI    Hypokalemia  Check labs    Murmur, heart  Awaiting cardiac eval        Nona Tellez MD  General Surgery  [facility]

## 2021-12-22 DIAGNOSIS — E87.6 HYPOKALEMIA: ICD-10-CM

## 2021-12-22 DIAGNOSIS — D50.9 IRON DEFICIENCY ANEMIA, UNSPECIFIED IRON DEFICIENCY ANEMIA TYPE: Primary | ICD-10-CM

## 2021-12-22 DIAGNOSIS — R79.89 ELEVATED LFTS: ICD-10-CM

## 2021-12-23 LAB — SPECIMEN TO PATHOLOGY - SURGICAL: NORMAL

## 2021-12-28 ENCOUNTER — LAB VISIT (OUTPATIENT)
Dept: LAB | Facility: HOSPITAL | Age: 66
End: 2021-12-28
Attending: SURGERY
Payer: MEDICARE

## 2021-12-28 DIAGNOSIS — E87.6 HYPOKALEMIA: ICD-10-CM

## 2021-12-28 DIAGNOSIS — R79.89 ELEVATED LFTS: ICD-10-CM

## 2021-12-28 DIAGNOSIS — D50.9 IRON DEFICIENCY ANEMIA, UNSPECIFIED IRON DEFICIENCY ANEMIA TYPE: ICD-10-CM

## 2021-12-28 LAB
ALBUMIN SERPL BCP-MCNC: 3.2 G/DL (ref 3.5–5.2)
ALP SERPL-CCNC: 51 U/L (ref 55–135)
ALT SERPL W/O P-5'-P-CCNC: 28 U/L (ref 10–44)
ANION GAP SERPL CALC-SCNC: 7 MMOL/L (ref 8–16)
AST SERPL-CCNC: 31 U/L (ref 10–40)
BILIRUB SERPL-MCNC: 0.5 MG/DL (ref 0.1–1)
BUN SERPL-MCNC: 19 MG/DL (ref 8–23)
CALCIUM SERPL-MCNC: 9.1 MG/DL (ref 8.7–10.5)
CHLORIDE SERPL-SCNC: 106 MMOL/L (ref 95–110)
CO2 SERPL-SCNC: 27 MMOL/L (ref 23–29)
CREAT SERPL-MCNC: 1.4 MG/DL (ref 0.5–1.4)
ERYTHROCYTE [DISTWIDTH] IN BLOOD BY AUTOMATED COUNT: 15.1 % (ref 11.5–14.5)
EST. GFR  (AFRICAN AMERICAN): >60 ML/MIN/1.73 M^2
EST. GFR  (NON AFRICAN AMERICAN): 52.4 ML/MIN/1.73 M^2
FERRITIN SERPL-MCNC: 30 NG/ML (ref 20–300)
GLUCOSE SERPL-MCNC: 129 MG/DL (ref 70–110)
HCT VFR BLD AUTO: 28.3 % (ref 40–54)
HGB BLD-MCNC: 9.4 G/DL (ref 14–18)
IRON SATN MFR SERPL: 18 % (ref 20–50)
IRON SERPL-MCNC: 66 UG/DL (ref 45–160)
MCH RBC QN AUTO: 29.9 PG (ref 27–31)
MCHC RBC AUTO-ENTMCNC: 33.2 G/DL (ref 32–36)
MCV RBC AUTO: 90 FL (ref 82–98)
PLATELET # BLD AUTO: 329 K/UL (ref 150–450)
PMV BLD AUTO: 8.9 FL (ref 9.2–12.9)
POTASSIUM SERPL-SCNC: 3.6 MMOL/L (ref 3.5–5.1)
PROT SERPL-MCNC: 7.8 G/DL (ref 6–8.4)
RBC # BLD AUTO: 3.14 M/UL (ref 4.6–6.2)
SODIUM SERPL-SCNC: 140 MMOL/L (ref 136–145)
TOTAL IRON BINDING CAPACITY: 372 UG/DL (ref 250–450)
WBC # BLD AUTO: 6.26 K/UL (ref 3.9–12.7)

## 2021-12-28 PROCEDURE — 83540 ASSAY OF IRON: CPT | Performed by: SURGERY

## 2021-12-28 PROCEDURE — 80053 COMPREHEN METABOLIC PANEL: CPT | Performed by: SURGERY

## 2021-12-28 PROCEDURE — 36415 COLL VENOUS BLD VENIPUNCTURE: CPT | Performed by: SURGERY

## 2021-12-28 PROCEDURE — 82728 ASSAY OF FERRITIN: CPT | Performed by: SURGERY

## 2021-12-28 PROCEDURE — 85027 COMPLETE CBC AUTOMATED: CPT | Performed by: SURGERY

## 2021-12-29 NOTE — PHYSICIAN QUERY
PT Name: Macho Diggs  MR #: [id number]     DOCUMENTATION CLARIFICATION      CDS: Brandy Capley, RN  Email: [email or web address]    This form is a permanent document in the medical record.     Query Date: December 29, 2021    By submitting this query, we are merely seeking further clarification of documentation.  Please utilize your independent clinical judgment when addressing the question(s) below.     The Medical Record contains the following:    Clinical Information Location in Medical Record     Diagnosis: anemia, unspecified type  Reason for IP Medical Treatment  (Clinical interventions that can only be accomplished in the IP setting? ) : Severe anemia, GI bleed    Procedure:   COLONOSCOPY   EGD, WITH POLYPECTOMY USING SNARE     Post-Operative Diagnosis:      * Gastrointestinal hemorrhage, unspecified gastrointestinal hemorrhage type      * Anemia   Gastritis  Gastric polyp  Diverticulosis     Indications:  Profound anemia    Findings:      Stomach:    Bile tinged fluid in the distal body and antrum.  Irritation noted within the antrum.  Biopsies taken for histopathology.  Small polypoid mass appreciated within the antrum.  Taken entirely with the snare and suctioned on the end of the scope for removal.    Sigmoid:    Diffuse diverticular disease     Patient is a 66 y.o. male presents with weakness and what he feels is syncopal episode.  He reports for the last few weeks he has been feeling progressively more tired.  He was working outside and came inside to rest.  He went to the restroom and thinks he blacked out.  He denies any recent issues with bright red blood per rectum.  For the last few days, he has had dark stools.  He has never had an episode like this in the past.     PRINCIPAL PROBLEM:  Anemia       STOMACH, ANTRUM, BIOPSY:  - Antral mucosa with mild chronic inactive gastritis.  - No Helicobacter pylori organisms identified by H&E stain.  - Negative for intestinal metaplasia, dysplasia, or  malignancy.    STOMACH, POLYP, BIOPSY:  - Early hyperplastic polyp, negative for dysplasia.  - Mild chronic inactive gastritis with reactive epithelial and stromal changes.   - Negative for malignancy.      Admit to inpatient order 12/11          Op note 12/14                                        H&P 12/11                Surgical pathology 12/14     Please document your best medical opinion regarding the etiology of _GI Hemorrhage_ _____?       [  x ] Diverticulosis     [   ] Gastric polyp     [   ] Chronic inactive gastritis     [   ] Other etiology (please specify):___________________     [  ] Clinically Undetermined             Please document in your progress notes daily for the duration of treatment, until resolved, and include in your discharge summary.

## 2022-03-12 ENCOUNTER — HOSPITAL ENCOUNTER (EMERGENCY)
Facility: HOSPITAL | Age: 67
Discharge: HOME | End: 2022-03-12
Attending: EMERGENCY MEDICINE
Payer: MEDICARE

## 2022-03-12 VITALS
RESPIRATION RATE: 18 BRPM | DIASTOLIC BLOOD PRESSURE: 96 MMHG | BODY MASS INDEX: 25.71 KG/M2 | SYSTOLIC BLOOD PRESSURE: 172 MMHG | OXYGEN SATURATION: 98 % | HEIGHT: 71 IN | HEART RATE: 77 BPM | TEMPERATURE: 98 F | WEIGHT: 183.63 LBS

## 2022-03-12 DIAGNOSIS — R07.9 CHEST PAIN: ICD-10-CM

## 2022-03-12 DIAGNOSIS — M25.519 SHOULDER PAIN: ICD-10-CM

## 2022-03-12 DIAGNOSIS — M25.519: ICD-10-CM

## 2022-03-12 DIAGNOSIS — S46.812A STRAIN OF SUPRASPINATUS MUSCLE OR TENDON, LEFT, INITIAL ENCOUNTER: Primary | ICD-10-CM

## 2022-03-12 LAB
ALBUMIN SERPL BCP-MCNC: 3.3 G/DL (ref 3.5–5.2)
ALP SERPL-CCNC: 53 U/L (ref 55–135)
ALT SERPL W/O P-5'-P-CCNC: 33 U/L (ref 10–44)
ANION GAP SERPL CALC-SCNC: 4 MMOL/L (ref 8–16)
AST SERPL-CCNC: 37 U/L (ref 10–40)
BASOPHILS # BLD AUTO: 0.02 K/UL (ref 0–0.2)
BASOPHILS NFR BLD: 0.4 % (ref 0–1.9)
BILIRUB SERPL-MCNC: 0.2 MG/DL (ref 0.1–1)
BUN SERPL-MCNC: 19 MG/DL (ref 8–23)
CALCIUM SERPL-MCNC: 8.7 MG/DL (ref 8.7–10.5)
CHLORIDE SERPL-SCNC: 105 MMOL/L (ref 95–110)
CO2 SERPL-SCNC: 29 MMOL/L (ref 23–29)
CREAT SERPL-MCNC: 1.3 MG/DL (ref 0.5–1.4)
DIFFERENTIAL METHOD: ABNORMAL
EOSINOPHIL # BLD AUTO: 0.1 K/UL (ref 0–0.5)
EOSINOPHIL NFR BLD: 2.3 % (ref 0–8)
ERYTHROCYTE [DISTWIDTH] IN BLOOD BY AUTOMATED COUNT: 15 % (ref 11.5–14.5)
EST. GFR  (AFRICAN AMERICAN): >60 ML/MIN/1.73 M^2
EST. GFR  (NON AFRICAN AMERICAN): 57.3 ML/MIN/1.73 M^2
GLUCOSE SERPL-MCNC: 90 MG/DL (ref 70–110)
HCT VFR BLD AUTO: 31.7 % (ref 40–54)
HGB BLD-MCNC: 10.5 G/DL (ref 14–18)
IMM GRANULOCYTES # BLD AUTO: 0.01 K/UL (ref 0–0.04)
IMM GRANULOCYTES NFR BLD AUTO: 0.2 % (ref 0–0.5)
LYMPHOCYTES # BLD AUTO: 1.4 K/UL (ref 1–4.8)
LYMPHOCYTES NFR BLD: 26.9 % (ref 18–48)
MCH RBC QN AUTO: 28.6 PG (ref 27–31)
MCHC RBC AUTO-ENTMCNC: 33.1 G/DL (ref 32–36)
MCV RBC AUTO: 86 FL (ref 82–98)
MONOCYTES # BLD AUTO: 0.6 K/UL (ref 0.3–1)
MONOCYTES NFR BLD: 11.6 % (ref 4–15)
NEUTROPHILS # BLD AUTO: 3 K/UL (ref 1.8–7.7)
NEUTROPHILS NFR BLD: 58.6 % (ref 38–73)
NRBC BLD-RTO: 0 /100 WBC
PLATELET # BLD AUTO: 283 K/UL (ref 150–450)
PMV BLD AUTO: 9 FL (ref 9.2–12.9)
POTASSIUM SERPL-SCNC: 3.9 MMOL/L (ref 3.5–5.1)
PROT SERPL-MCNC: 8.3 G/DL (ref 6–8.4)
RBC # BLD AUTO: 3.67 M/UL (ref 4.6–6.2)
SODIUM SERPL-SCNC: 138 MMOL/L (ref 136–145)
TROPONIN I SERPL DL<=0.01 NG/ML-MCNC: 13.2 PG/ML (ref 0–60)
WBC # BLD AUTO: 5.17 K/UL (ref 3.9–12.7)

## 2022-03-12 PROCEDURE — 85025 COMPLETE CBC W/AUTO DIFF WBC: CPT | Performed by: EMERGENCY MEDICINE

## 2022-03-12 PROCEDURE — 84484 ASSAY OF TROPONIN QUANT: CPT | Performed by: EMERGENCY MEDICINE

## 2022-03-12 PROCEDURE — 99285 EMERGENCY DEPT VISIT HI MDM: CPT | Mod: 25

## 2022-03-12 PROCEDURE — 36415 COLL VENOUS BLD VENIPUNCTURE: CPT | Performed by: EMERGENCY MEDICINE

## 2022-03-12 PROCEDURE — 93010 EKG 12-LEAD: ICD-10-PCS | Mod: ,,, | Performed by: INTERNAL MEDICINE

## 2022-03-12 PROCEDURE — 25000003 PHARM REV CODE 250: Performed by: EMERGENCY MEDICINE

## 2022-03-12 PROCEDURE — 80053 COMPREHEN METABOLIC PANEL: CPT | Performed by: EMERGENCY MEDICINE

## 2022-03-12 PROCEDURE — 96372 THER/PROPH/DIAG INJ SC/IM: CPT | Performed by: EMERGENCY MEDICINE

## 2022-03-12 PROCEDURE — 63600175 PHARM REV CODE 636 W HCPCS: Performed by: EMERGENCY MEDICINE

## 2022-03-12 PROCEDURE — 93005 ELECTROCARDIOGRAM TRACING: CPT

## 2022-03-12 PROCEDURE — 93010 ELECTROCARDIOGRAM REPORT: CPT | Mod: ,,, | Performed by: INTERNAL MEDICINE

## 2022-03-12 RX ORDER — LIDOCAINE 50 MG/G
1 PATCH TOPICAL
Status: DISCONTINUED | OUTPATIENT
Start: 2022-03-12 | End: 2022-03-12 | Stop reason: HOSPADM

## 2022-03-12 RX ORDER — KETOROLAC TROMETHAMINE 30 MG/ML
15 INJECTION, SOLUTION INTRAMUSCULAR; INTRAVENOUS
Status: COMPLETED | OUTPATIENT
Start: 2022-03-12 | End: 2022-03-12

## 2022-03-12 RX ORDER — LIDOCAINE 50 MG/G
1 PATCH TOPICAL DAILY PRN
Qty: 10 PATCH | Refills: 0 | Status: SHIPPED | OUTPATIENT
Start: 2022-03-12 | End: 2022-03-22

## 2022-03-12 RX ORDER — NAPROXEN 500 MG/1
500 TABLET ORAL EVERY 12 HOURS PRN
Qty: 20 TABLET | Refills: 0 | Status: ON HOLD | OUTPATIENT
Start: 2022-03-12 | End: 2023-04-24 | Stop reason: CLARIF

## 2022-03-12 RX ORDER — CYCLOBENZAPRINE HCL 5 MG
5 TABLET ORAL 3 TIMES DAILY PRN
Qty: 15 TABLET | Refills: 0 | Status: SHIPPED | OUTPATIENT
Start: 2022-03-12 | End: 2022-03-17

## 2022-03-12 RX ADMIN — LIDOCAINE 1 PATCH: 50 PATCH TOPICAL at 03:03

## 2022-03-12 RX ADMIN — KETOROLAC TROMETHAMINE 15 MG: 30 INJECTION, SOLUTION INTRAMUSCULAR at 03:03

## 2022-03-12 NOTE — ED PROVIDER NOTES
EMERGENCY DEPARTMENT HISTORY AND PHYSICAL EXAM          Date: 3/12/2022   Patient Name: Macho Diggs       History of Presenting Illness           Chief Complaint   Patient presents with    Shoulder Pain     Pt states under his left shoulder blade is really hurting, he states he did not do anything         History Provided By: Patient    0224   Macho Diggs is a 66 y.o. male with PMHX of  hypertension, GERD, hyperlipidemia who presents to the emergency department C/O left shoulder.    Patient reports pain just above his left shoulder blade.  Symptoms ongoing for 2 days.  No new injury or incident that triggered it.  Came to ER this morning because he cannot sleep      PCP: Seun Dodge NP        Current Facility-Administered Medications   Medication Dose Route Frequency Provider Last Rate Last Admin    LIDOcaine 5 % patch 1 patch  1 patch Transdermal ED 1 Time Ayan Buitrago MD   1 patch at 03/12/22 0302     Current Outpatient Medications   Medication Sig Dispense Refill    amlodipine (NORVASC) 10 MG tablet Take 1 tablet (10 mg total) by mouth once daily. 30 tablet 11    atorvastatin (LIPITOR) 10 MG tablet Take 10 mg by mouth once daily.      BYSTOLIC 10 mg Tab Take 10 mg by mouth once daily.  3    ciclopirox (LOPROX) 0.77 % Crea Apply a small amount to affected area twice a day PRN JOCK ITCH  3    cyclobenzaprine (FLEXERIL) 5 MG tablet Take 1 tablet (5 mg total) by mouth 3 (three) times daily as needed for Muscle spasms. 15 tablet 0    desonide 0.05% (DESOWEN) 0.05 % Oint apply a small amount to affected area twice a day as needed for rash  0    gabapentin (NEURONTIN) 300 MG capsule Take 1 capsule (300 mg total) by mouth 2 (two) times daily. 60 capsule 11    hydrochlorothiazide (HYDRODIURIL) 25 MG tablet Take 1 tablet (25 mg total) by mouth once daily. 30 tablet 11    LIDOcaine (LIDODERM) 5 % Place 1 patch onto the skin daily as needed (Pain). Remove & Discard patch within 12 hours or as  directed by MD 10 patch 0    naproxen (NAPROSYN) 500 MG tablet Take 1 tablet (500 mg total) by mouth every 12 (twelve) hours as needed (Pain). 20 tablet 0    pantoprazole (PROTONIX) 40 MG tablet Take 1 tablet (40 mg total) by mouth once daily. 30 tablet 11    sucralfate (CARAFATE) 1 gram tablet Take 1 tablet (1 g total) by mouth 4 (four) times daily before meals and nightly. 120 tablet 0           Past History     Past Medical History:   Past Medical History:   Diagnosis Date    Carpal tunnel syndrome     GERD (gastroesophageal reflux disease)     Heart murmur     Hyperlipidemia     Hypertension         Past Surgical History:   Past Surgical History:   Procedure Laterality Date    COLONOSCOPY N/A 10/14/2016    Procedure: COLONOSCOPY;  Surgeon: Elizabet Jorgensen MD;  Location: [facility];  Service: Endoscopy;  Laterality: N/A;    COLONOSCOPY N/A 2021    Procedure: COLONOSCOPY;  Surgeon: Nona Tellez MD;  Location: [facility];  Service: General;  Laterality: N/A;    ESOPHAGOGASTRODUODENOSCOPY N/A 2019    Procedure: ESOPHAGOGASTRODUODENOSCOPY (EGD);  Surgeon: Jocelyn Jorgensen MD;  Location: [facility];  Service: Endoscopy;  Laterality: N/A;    HERNIA REPAIR      TONSILLECTOMY          Family History:   Family History   Problem Relation Age of Onset    Breast cancer Mother     Colon cancer Father     Colon cancer Brother         Social History:   Social History     Tobacco Use    Smoking status: Former Smoker     Packs/day: 0.20     Years: 15.00     Pack years: 3.00     Types: Cigarettes     Start date: 10/12/1974     Quit date: 1985     Years since quittin.3    Smokeless tobacco: Never Used   Substance Use Topics    Alcohol use: Yes     Comment: 6*16 oz beers daily     Drug use: No        Allergies:   Review of patient's allergies indicates:  No Known Allergies       Review of Systems   Review of Systems   Respiratory: Negative for cough, chest tightness and shortness  "of breath.    Cardiovascular: Negative for chest pain.   Gastrointestinal: Negative for abdominal pain, nausea and vomiting.   Musculoskeletal: Positive for arthralgias.   All other systems reviewed and are negative.               Physical Exam     Vitals:    03/12/22 0215 03/12/22 0333   BP: (!) 172/96    Pulse: 77    Resp: 18    Temp: 98.3 °F (36.8 °C)    SpO2: 98%    Weight: 83.3 kg (183 lb 9.6 oz)    Height:  5' 11" (1.803 m)      Physical Exam  Vitals and nursing note reviewed.   Constitutional:       General: He is not in acute distress.     Appearance: Normal appearance. He is well-developed. He is not ill-appearing.   HENT:      Head: Normocephalic and atraumatic.      Nose: No congestion or rhinorrhea.   Eyes:      Conjunctiva/sclera: Conjunctivae normal.      Pupils: Pupils are equal, round, and reactive to light.   Cardiovascular:      Rate and Rhythm: Regular rhythm.   Pulmonary:      Effort: Pulmonary effort is normal. No respiratory distress.   Musculoskeletal:         General: No deformity or signs of injury. Normal range of motion.      Left shoulder: Tenderness present. Normal range of motion.      Right upper arm: Normal.      Left upper arm: Normal.      Right elbow: Normal.      Left elbow: Normal.      Right forearm: Normal.      Left forearm: Normal.      Right hand: Normal.      Left hand: Normal.        Arms:       Cervical back: Normal range of motion and neck supple.      Comments: Full range of motion of both shoulders, push-off test normal, empty can test on left causes patient to have increased pain and drops   Skin:     General: Skin is warm and dry.      Coloration: Skin is not pale.      Findings: No lesion.   Neurological:      General: No focal deficit present.      Mental Status: He is alert and oriented to person, place, and time.      Cranial Nerves: No cranial nerve deficit.      Sensory: No sensory deficit.      Motor: No weakness.   Psychiatric:         Mood and Affect: Mood " normal.         Behavior: Behavior normal.              Diagnostic Study Results      Labs -   Recent Results (from the past 12 hour(s))   CBC auto differential    Collection Time: 03/12/22  3:11 AM   Result Value Ref Range    WBC 5.17 3.90 - 12.70 K/uL    RBC 3.67 (L) 4.60 - 6.20 M/uL    Hemoglobin 10.5 (L) 14.0 - 18.0 g/dL    Hematocrit 31.7 (L) 40.0 - 54.0 %    MCV 86 82 - 98 fL    MCH 28.6 27.0 - 31.0 pg    MCHC 33.1 32.0 - 36.0 g/dL    RDW 15.0 (H) 11.5 - 14.5 %    Platelets 283 150 - 450 K/uL    MPV 9.0 (L) 9.2 - 12.9 fL    Immature Granulocytes 0.2 0.0 - 0.5 %    Gran # (ANC) 3.0 1.8 - 7.7 K/uL    Immature Grans (Abs) 0.01 0.00 - 0.04 K/uL    Lymph # 1.4 1.0 - 4.8 K/uL    Mono # 0.6 0.3 - 1.0 K/uL    Eos # 0.1 0.0 - 0.5 K/uL    Baso # 0.02 0.00 - 0.20 K/uL    nRBC 0 0 /100 WBC    Gran % 58.6 38.0 - 73.0 %    Lymph % 26.9 18.0 - 48.0 %    Mono % 11.6 4.0 - 15.0 %    Eosinophil % 2.3 0.0 - 8.0 %    Basophil % 0.4 0.0 - 1.9 %    Differential Method Automated    Comprehensive metabolic panel    Collection Time: 03/12/22  3:11 AM   Result Value Ref Range    Sodium 138 136 - 145 mmol/L    Potassium 3.9 3.5 - 5.1 mmol/L    Chloride 105 95 - 110 mmol/L    CO2 29 23 - 29 mmol/L    Glucose 90 70 - 110 mg/dL    BUN 19 8 - 23 mg/dL    Creatinine 1.3 0.5 - 1.4 mg/dL    Calcium 8.7 8.7 - 10.5 mg/dL    Total Protein 8.3 6.0 - 8.4 g/dL    Albumin 3.3 (L) 3.5 - 5.2 g/dL    Total Bilirubin 0.2 0.1 - 1.0 mg/dL    Alkaline Phosphatase 53 (L) 55 - 135 U/L    AST 37 10 - 40 U/L    ALT 33 10 - 44 U/L    Anion Gap 4 (L) 8 - 16 mmol/L    eGFR if African American >60.0 >60 mL/min/1.73 m^2    eGFR if non  57.3 (A) >60 mL/min/1.73 m^2   TROPONIN I HIGH SENSITIVITY    Collection Time: 03/12/22  3:11 AM   Result Value Ref Range    Troponin I High Sensitivity 13.2 0.0 - 60.0 pg/mL        Radiologic Studies -    X-Ray Chest PA And Lateral    (Results Pending)        Medications given in the ED-   Medications   LIDOcaine 5 %  patch 1 patch (1 patch Transdermal Patch Applied 3/12/22 0302)   ketorolac injection 15 mg (15 mg Intramuscular Given 3/12/22 0301)           Medical Decision Making    I am the first provider for this patient.     I reviewed the vital signs, available nursing notes, past medical history, past surgical history, family history and social history.     Vital Signs:  Reviewed the patient's vital signs.     Pulse Oximetry Analysis and Interpretation:    98% on Room Air, normal    EKG interpretation: (Preliminary)   Interpreted by Ayan Buitrago MD at 0231   Normal sinus rhythm rate 68, normal intervals, normal axis, T-wave inversions in inferior leads new from prior study but similar to study from 2019    CXR  Interpretation: (Preliminary)   CXR read by Dr. Ayan Buitrago at     No acute findings in chest, cardiac silhouette appears normal, no pleural effusion, no consult       Records Reviewed: Nursing notes.        Provider Notes (Medical Decision Making): Macho Diggs is a 66 y.o. male here with left shoulder pain.  Reproducible palpation, pain is lung supraspinatus muscle.  Exacerbated by empty can test.  No new injury.  Will check x-ray, EKG.  Patient denies typical anginal like symptoms.  No respiratory symptoms.  If unremarkable will treat symptomatically      Procedures:   Procedures      ED Course:      Cardiac enzymes negative.  Labs overall benign and stable anemia.  Will treat patient for MSK pain discharge to primary care follow up         Diagnosis and Disposition     Critical Care:      DISCHARGE NOTE:       Macho Diggs's  results have been reviewed with him.  He has been counseled regarding his diagnosis, treatment, and plan.  He verbally conveys understanding and agreement of the signs, symptoms, diagnosis, treatment and prognosis and additionally agrees to follow up as discussed.  He also agrees with the care-plan and conveys that all of his questions have been answered.  I have also provided  discharge instructions for him that include: educational information regarding their diagnosis and treatment, and list of reasons why they would want to return to the ED prior to their follow-up appointment, should his condition change. He has been provided with education for proper emergency department utilization.         CLINICAL IMPRESSION:        1. Strain of supraspinatus muscle or tendon, left, initial encounter    2. Shoulder pain    3. Shoulder arthralgia    4. Chest pain              PLAN:   1. Discharge Home  2.      Medication List      START taking these medications    cyclobenzaprine 5 MG tablet  Commonly known as: FLEXERIL  Take 1 tablet (5 mg total) by mouth 3 (three) times daily as needed for Muscle spasms.     LIDOcaine 5 %  Commonly known as: LIDODERM  Place 1 patch onto the skin daily as needed (Pain). Remove & Discard patch within 12 hours or as directed by MD     naproxen 500 MG tablet  Commonly known as: NAPROSYN  Take 1 tablet (500 mg total) by mouth every 12 (twelve) hours as needed (Pain).        ASK your doctor about these medications    amLODIPine 10 MG tablet  Commonly known as: NORVASC  Take 1 tablet (10 mg total) by mouth once daily.     atorvastatin 10 MG tablet  Commonly known as: LIPITOR     BYSTOLIC 10 MG Tab  Generic drug: nebivoloL     ciclopirox 0.77 % Crea  Commonly known as: LOPROX     desonide 0.05% 0.05 % Oint  Commonly known as: DESOWEN     gabapentin 300 MG capsule  Commonly known as: NEURONTIN  Take 1 capsule (300 mg total) by mouth 2 (two) times daily.     hydroCHLOROthiazide 25 MG tablet  Commonly known as: HYDRODIURIL  Take 1 tablet (25 mg total) by mouth once daily.     pantoprazole 40 MG tablet  Commonly known as: PROTONIX  Take 1 tablet (40 mg total) by mouth once daily.     sucralfate 1 gram tablet  Commonly known as: CARAFATE  Take 1 tablet (1 g total) by mouth 4 (four) times daily before meals and nightly.           Where to Get Your Medications      These  medications were sent to [facility] - [place], [place] - [address]  [address], [facility] [address]    Phone: [phone]   · cyclobenzaprine 5 MG tablet  · LIDOcaine 5 %  · naproxen 500 MG tablet        3. Se[place] Dodge, NP  [address] [facility] [address] [facility] [address]  [place] [address]  [phone]    Schedule an appointment as soon as possible for a visit   Primary care follow up    [facility] Emergency Department  [address] [place] [address] [phone]  Go to   If symptoms worsen       _______________________________     Please note that this dictation was completed with [employer], the computer voice recognition software.  Quite often unanticipated grammatical, syntax, homophones, and other interpretive errors are inadvertently transcribed by the computer software.  Please disregard these errors.  Please excuse any errors that have escaped final proofreading.             Ayan Buitrago MD  03/12/22 0494

## 2022-12-06 ENCOUNTER — HOSPITAL ENCOUNTER (EMERGENCY)
Facility: HOSPITAL | Age: 67
Discharge: HOME | End: 2022-12-06
Attending: EMERGENCY MEDICINE
Payer: MEDICARE

## 2022-12-06 VITALS
RESPIRATION RATE: 16 BRPM | HEART RATE: 85 BPM | OXYGEN SATURATION: 99 % | WEIGHT: 157.81 LBS | HEIGHT: 71 IN | SYSTOLIC BLOOD PRESSURE: 147 MMHG | DIASTOLIC BLOOD PRESSURE: 86 MMHG | BODY MASS INDEX: 22.09 KG/M2 | TEMPERATURE: 98 F

## 2022-12-06 DIAGNOSIS — K40.90 NON-RECURRENT UNILATERAL INGUINAL HERNIA WITHOUT OBSTRUCTION OR GANGRENE: Primary | ICD-10-CM

## 2022-12-06 PROCEDURE — 99282 EMERGENCY DEPT VISIT SF MDM: CPT

## 2022-12-06 RX ORDER — BROMPHENIRAMINE MALEATE, DEXTROMETHORPHAN HBR, PHENYLEPHRINE HCL, DIPHENHYDRAMINE HCL, PHENYLEPHRINE HCL 0.52G
0.52 KIT ORAL DAILY
Qty: 30 CAPSULE | Refills: 0 | Status: SHIPPED | OUTPATIENT
Start: 2022-12-06

## 2022-12-06 RX ORDER — POLYETHYLENE GLYCOL 3350 17 G/17G
17 POWDER, FOR SOLUTION ORAL DAILY PRN
Qty: 765 G | Refills: 0 | Status: SHIPPED | OUTPATIENT
Start: 2022-12-06

## 2022-12-06 NOTE — ED PROVIDER NOTES
EMERGENCY DEPARTMENT HISTORY AND PHYSICAL EXAM     This note is dictated on [employer] word recognition program.  There are word recognition mistakes and grammatical errors that are occasionally missed on review.     Date: 12/6/2022   Patient Name: Macho Diggs       History of Presenting Illness           Chief Complaint   Patient presents with    Abdominal Pain     Patient states he is having right lower abdominal pain. Denies any burning or pain with urination. Patient states the pain started this morning.          History Provided By: Patient    0959   Macho Diggs is a 67 y.o. male with PMHX of hypertension, hyperlipidemia, GERD, ventral hernia status post repair who presents to the emergency department C/O abdominal pain.    Patient reports he knows bulging and swelling of his left groin area after working out today. It was able to reduce on its own.  Reports burning like sensation.  Tolerating p.o..  Had some constipation yesterday      PCP: Seun Dodge NP        No current facility-administered medications for this encounter.     Current Outpatient Medications   Medication Sig Dispense Refill    amlodipine (NORVASC) 10 MG tablet Take 1 tablet (10 mg total) by mouth once daily. 30 tablet 11    atorvastatin (LIPITOR) 10 MG tablet Take 10 mg by mouth once daily.      BYSTOLIC 10 mg Tab Take 10 mg by mouth once daily.  3    ciclopirox (LOPROX) 0.77 % Crea Apply a small amount to affected area twice a day PRN JOCK ITCH  3    desonide 0.05% (DESOWEN) 0.05 % Oint apply a small amount to affected area twice a day as needed for rash  0    gabapentin (NEURONTIN) 300 MG capsule Take 1 capsule (300 mg total) by mouth 2 (two) times daily. 60 capsule 11    hydrochlorothiazide (HYDRODIURIL) 25 MG tablet Take 1 tablet (25 mg total) by mouth once daily. 30 tablet 11    naproxen (NAPROSYN) 500 MG tablet Take 1 tablet (500 mg total) by mouth every 12 (twelve) hours as needed (Pain). 20 tablet 0    pantoprazole (PROTONIX)  40 MG tablet Take 1 tablet (40 mg total) by mouth once daily. 30 tablet 11    polyethylene glycol (GLYCOLAX) 17 gram/dose powder Take 17 g by mouth daily as needed (constipation). 765 g 0    psyllium 0.52 gram capsule Take 1 capsule (0.52 g total) by mouth once daily. 30 capsule 0    sennosides-docusate sodium 8.6-50 mg Cap Take 2 capsules by mouth 2 (two) times daily as needed (Constipation). 60 capsule 0    sucralfate (CARAFATE) 1 gram tablet Take 1 tablet (1 g total) by mouth 4 (four) times daily before meals and nightly. 120 tablet 0           Past History     Past Medical History:   Past Medical History:   Diagnosis Date    Carpal tunnel syndrome     GERD (gastroesophageal reflux disease)     Heart murmur     Hyperlipidemia     Hypertension         Past Surgical History:   Past Surgical History:   Procedure Laterality Date    COLONOSCOPY N/A 10/14/2016    Procedure: COLONOSCOPY;  Surgeon: Elizabet Jorgensen MD;  [facility];  Service: Endoscopy;  Laterality: N/A;    COLONOSCOPY N/A 2021    Procedure: COLONOSCOPY;  Surgeon: Nona Tellez MD;  Location[facility];  Service: General;  Laterality: N/A;    ESOPHAGOGASTRODUODENOSCOPY N/A 2019    Procedure: ESOPHAGOGASTRODUODENOSCOPY (EGD);  Surgeon: Jocelyn Jorgensen MD;  [facility];  Service: Endoscopy;  Laterality: N/A;    HERNIA REPAIR      TONSILLECTOMY          Family History:   Family History   Problem Relation Age of Onset    Breast cancer Mother     Colon cancer Father     Colon cancer Brother         Social History:   Social History     Tobacco Use    Smoking status: Former     Packs/day: 0.20     Years: 15.00     Pack years: 3.00     Types: Cigarettes     Start date: 10/12/1974     Quit date: 1985     Years since quittin.0    Smokeless tobacco: Never   Substance Use Topics    Alcohol use: Yes     Comment: 6*16 oz beers daily     Drug use: No        Allergies:   Review of patient's allergies indicates:  No  "Known Allergies       Review of Systems   Review of Systems   Constitutional:  Negative for chills and fever.   Gastrointestinal:  Positive for abdominal pain and constipation. Negative for nausea and vomiting.   All other systems reviewed and are negative.             Physical Exam     Vitals:    12/06/22 0921   BP: (!) 147/86   Pulse: 85   Resp: 16   Temp: 98.3 °F (36.8 °C)   TempSrc: Oral   SpO2: 99%   Weight: 71.6 kg (157 lb 12.8 oz)   Height: 5' 11" (1.803 m)      Physical Exam  Vitals and nursing note reviewed.   Constitutional:       General: He is not in acute distress.     Appearance: Normal appearance. He is well-developed. He is not ill-appearing.   HENT:      Head: Normocephalic and atraumatic.   Eyes:      Extraocular Movements: Extraocular movements intact.      Conjunctiva/sclera: Conjunctivae normal.   Pulmonary:      Effort: Pulmonary effort is normal. No respiratory distress.   Abdominal:      General: Bowel sounds are normal.      Tenderness: There is no abdominal tenderness. There is no guarding or rebound.      Hernia: A hernia is present. Hernia is present in the left inguinal area (soft, self reduces).   Musculoskeletal:         General: No deformity or signs of injury. Normal range of motion.      Cervical back: Normal range of motion. No rigidity.   Skin:     General: Skin is dry.      Coloration: Skin is not pale.      Findings: No rash.   Neurological:      General: No focal deficit present.      Mental Status: He is alert and oriented to person, place, and time.      Cranial Nerves: No cranial nerve deficit.      Motor: No weakness.      Coordination: Coordination normal.   Psychiatric:         Mood and Affect: Mood normal.         Behavior: Behavior normal.            Diagnostic Study Results      Labs -   No results found for this or any previous visit (from the past 12 hour(s)).     Radiologic Studies -    No orders to display        Medications given in the ED-   Medications - No data " to display        Medical Decision Making    I am the first provider for this patient.     I reviewed the vital signs, available nursing notes, past medical history, past surgical history, family history and social history.     Vital Signs:  Reviewed the patient's vital signs.     Pulse Oximetry Analysis and Interpretation:    99% on Room Air, normal      Records Reviewed: Nursing notes.        Provider Notes (Medical Decision Making): Macho Diggs is a 67 y.o. male with exam findings consistent with left inguinal hernia.  Not incarcerated or strangulated.  Discussed typical course.  Advised patient against heavy lifting or things that cause increased intra-abdominal pressure.  Will start a bowel regimen refer to General surgery.  Discussed hernia supports straps and symptoms and reasons to return to ED        Procedures:   Procedures      ED Course:               Diagnosis and Disposition     Critical Care:      DISCHARGE NOTE:       Macho Diggs's  results have been reviewed with him.  He has been counseled regarding his diagnosis, treatment, and plan.  He verbally conveys understanding and agreement of the signs, symptoms, diagnosis, treatment and prognosis and additionally agrees to follow up as discussed.  He also agrees with the care-plan and conveys that all of his questions have been answered.  I have also provided discharge instructions for him that include: educational information regarding their diagnosis and treatment, and list of reasons why they would want to return to the ED prior to their follow-up appointment, should his condition change. He has been provided with education for proper emergency department utilization.         CLINICAL IMPRESSION:         1. Non-recurrent unilateral inguinal hernia without obstruction or gangrene              PLAN:   1. Discharge Home  2.      Medication List        START taking these medications      polyethylene glycol 17 gram/dose powder  Commonly known as:  GLYCOLAX  Take 17 g by mouth daily as needed (constipation).     psyllium 0.52 gram capsule  Take 1 capsule (0.52 g total) by mouth once daily.     sennosides-docusate sodium 8.6-50 mg Cap  Take 2 capsules by mouth 2 (two) times daily as needed (Constipation).            ASK your doctor about these medications      amLODIPine 10 MG tablet  Commonly known as: NORVASC  Take 1 tablet (10 mg total) by mouth once daily.     atorvastatin 10 MG tablet  Commonly known as: LIPITOR     BYSTOLIC 10 MG Tab  Generic drug: nebivoloL     ciclopirox 0.77 % Crea  Commonly known as: LOPROX     desonide 0.05% 0.05 % Oint  Commonly known as: DESOWEN     gabapentin 300 MG capsule  Commonly known as: NEURONTIN  Take 1 capsule (300 mg total) by mouth 2 (two) times daily.     hydroCHLOROthiazide 25 MG tablet  Commonly known as: HYDRODIURIL  Take 1 tablet (25 mg total) by mouth once daily.     naproxen 500 MG tablet  Commonly known as: NAPROSYN  Take 1 tablet (500 mg total) by mouth every 12 (twelve) hours as needed (Pain).     pantoprazole 40 MG tablet  Commonly known as: PROTONIX  Take 1 tablet (40 mg total) by mouth once daily.     sucralfate 1 gram tablet  Commonly known as: CARAFATE  Take 1 tablet (1 g total) by mouth 4 (four) times daily before meals and nightly.               Where to Get Your Medications        These medications were sent to [address] [address]      Phone: [phone]   polyethylene glycol 17 gram/dose powder  psyllium 0.52 gram capsule  sennosides-docusate sodium 8.6-50 mg Cap        3. Seun Dodge NP  [address]  [place] [address] [phone]    Schedule an appointment as soon as possible for a visit       Caprice Krueger MD  [address] [place] [address] [phone]    Schedule an appointment as soon as possible for a visit   Surgery       _______________________________     Please note that  this dictation was completed with [employer], the [employer] voice recognition software.  Quite often unanticipated grammatical, syntax, homophones, and other interpretive errors are inadvertently transcribed by the computer software.  Please disregard these errors.  Please excuse any errors that have escaped final proofreading.             Ayan Buitrago MD  12/06/22 1002

## 2023-01-25 DIAGNOSIS — R79.89 ELEVATED LFTS: Primary | ICD-10-CM

## 2023-01-26 ENCOUNTER — HOSPITAL ENCOUNTER (OUTPATIENT)
Dept: RADIOLOGY | Facility: HOSPITAL | Age: 68
Discharge: HOME | End: 2023-01-26
Attending: NURSE PRACTITIONER
Payer: MEDICARE

## 2023-01-26 DIAGNOSIS — R79.89 ELEVATED LFTS: ICD-10-CM

## 2023-01-26 PROCEDURE — 76705 ECHO EXAM OF ABDOMEN: CPT | Mod: TC

## 2023-03-30 DIAGNOSIS — K40.90 NON-RECURRENT UNILATERAL INGUINAL HERNIA WITHOUT OBSTRUCTION OR GANGRENE: Primary | ICD-10-CM

## 2023-03-30 RX ORDER — CEFAZOLIN SODIUM 2 G/50ML
2 SOLUTION INTRAVENOUS
Status: CANCELLED | OUTPATIENT
Start: 2023-03-30

## 2023-03-30 RX ORDER — LIDOCAINE HYDROCHLORIDE 10 MG/ML
1 INJECTION, SOLUTION EPIDURAL; INFILTRATION; INTRACAUDAL; PERINEURAL ONCE AS NEEDED
Status: CANCELLED | OUTPATIENT
Start: 2023-03-30 | End: 2034-08-26

## 2023-03-30 RX ORDER — SODIUM CHLORIDE 0.9 % (FLUSH) 0.9 %
10 SYRINGE (ML) INJECTION
Status: CANCELLED | OUTPATIENT
Start: 2023-03-30

## 2023-03-30 RX ORDER — SODIUM CHLORIDE 9 MG/ML
INJECTION, SOLUTION INTRAVENOUS CONTINUOUS
Status: CANCELLED | OUTPATIENT
Start: 2023-03-30

## 2023-04-17 ENCOUNTER — HOSPITAL ENCOUNTER (OUTPATIENT)
Dept: PULMONOLOGY | Facility: HOSPITAL | Age: 68
Discharge: HOME | End: 2023-04-17
Attending: SURGERY
Payer: MEDICARE

## 2023-04-17 ENCOUNTER — HOSPITAL ENCOUNTER (OUTPATIENT)
Dept: PREADMISSION TESTING | Facility: HOSPITAL | Age: 68
Discharge: HOME | End: 2023-04-17
Attending: SURGERY
Payer: MEDICARE

## 2023-04-17 VITALS — HEIGHT: 71 IN | WEIGHT: 160 LBS | BODY MASS INDEX: 22.4 KG/M2

## 2023-04-17 DIAGNOSIS — K40.90 NON-RECURRENT UNILATERAL INGUINAL HERNIA WITHOUT OBSTRUCTION OR GANGRENE: ICD-10-CM

## 2023-04-17 PROCEDURE — 93010 ELECTROCARDIOGRAM REPORT: CPT | Mod: ,,, | Performed by: INTERNAL MEDICINE

## 2023-04-17 PROCEDURE — 93010 EKG 12-LEAD: ICD-10-PCS | Mod: ,,, | Performed by: INTERNAL MEDICINE

## 2023-04-17 PROCEDURE — 93005 ELECTROCARDIOGRAM TRACING: CPT

## 2023-04-21 ENCOUNTER — ANESTHESIA EVENT (OUTPATIENT)
Dept: SURGERY | Facility: HOSPITAL | Age: 68
End: 2023-04-21
Payer: MEDICARE

## 2023-04-21 PROBLEM — K40.90 LEFT INGUINAL HERNIA: Status: ACTIVE | Noted: 2023-04-01

## 2023-04-21 NOTE — ANESTHESIA PREPROCEDURE EVALUATION
04/21/2023  Macho Diggs is a 67 y.o., male.      Pre-op Assessment    I have reviewed the Patient Summary Reports.    I have reviewed the NPO Status.   I have reviewed the Medications.     Review of Systems  Anesthesia Hx:  No problems with previous Anesthesia  Denies Family Hx of Anesthesia complications.   Denies Personal Hx of Anesthesia complications.   Social:  Former Smoker    Cardiovascular:   Hypertension, well controlled ECG has been reviewed. murmur   Pulmonary:  Pulmonary Normal    Renal/:  Renal/ Normal     Hepatic/GI:   GERD, well controlled    Neurological:  Neurology Normal    Endocrine:  Endocrine Normal      Lab Results   Component Value Date    WBC 4.94 04/17/2023    HGB 10.7 (L) 04/17/2023    HCT 31.4 (L) 04/17/2023    MCV 93 04/17/2023     04/17/2023     CMP  Sodium   Date Value Ref Range Status   04/17/2023 140 136 - 145 mmol/L Final     Potassium   Date Value Ref Range Status   04/17/2023 3.7 3.5 - 5.1 mmol/L Final     Chloride   Date Value Ref Range Status   04/17/2023 107 95 - 110 mmol/L Final     CO2   Date Value Ref Range Status   04/17/2023 30 (H) 23 - 29 mmol/L Final     Glucose   Date Value Ref Range Status   04/17/2023 83 70 - 110 mg/dL Final     BUN   Date Value Ref Range Status   04/17/2023 18 8 - 23 mg/dL Final     Creatinine   Date Value Ref Range Status   04/17/2023 1.4 0.5 - 1.4 mg/dL Final     Calcium   Date Value Ref Range Status   04/17/2023 9.5 8.7 - 10.5 mg/dL Final     Total Protein   Date Value Ref Range Status   04/17/2023 7.7 6.0 - 8.4 g/dL Final     Albumin   Date Value Ref Range Status   04/17/2023 3.3 (L) 3.5 - 5.2 g/dL Final     Total Bilirubin   Date Value Ref Range Status   04/17/2023 0.6 0.1 - 1.0 mg/dL Final     Comment:     For infants and newborns, interpretation of results should be based  on gestational age, weight and in agreement with  clinical  observations.    Premature Infant recommended reference ranges:  Up to 24 hours.............<8.0 mg/dL  Up to 48 hours............<12.0 mg/dL  3-5 days..................<15.0 mg/dL  6-29 days.................<15.0 mg/dL    For patients on Eltrombopag therapy, use of Dimension [place] TBIL is   not   recommended.       Alkaline Phosphatase   Date Value Ref Range Status   04/17/2023 88 55 - 135 U/L Final     AST   Date Value Ref Range Status   04/17/2023 252 (H) 10 - 40 U/L Final     ALT   Date Value Ref Range Status   04/17/2023 263 (H) 10 - 44 U/L Final     Anion Gap   Date Value Ref Range Status   04/17/2023 3 (L) 8 - 16 mmol/L Final     eGFR   Date Value Ref Range Status   04/17/2023 55.4 (A) >60 mL/min/1.73 m^2 Final       Physical Exam  General: Well nourished    Airway:  Mallampati: III / III  Mouth Opening: Normal  TM Distance: Normal  Tongue: Normal  Neck ROM: Normal ROM    Dental:  Intact    Chest/Lungs:  Clear to auscultation    Heart:  Rate: Normal  Rhythm: Regular Rhythm  Sounds: Normal        Anesthesia Plan  Type of Anesthesia, risks & benefits discussed:    Anesthesia Type: Gen Supraglottic Airway, Gen ETT, Spinal  Intra-op Monitoring Plan: Standard ASA Monitors  Post Op Pain Control Plan: multimodal analgesia  Induction:  IV  Airway Plan: Direct  Informed Consent: Informed consent signed with the Patient and all parties understand the risks and agree with anesthesia plan.  All questions answered.   ASA Score: 3  Day of Surgery Review of History & Physical: I have interviewed and examined the patient. I have reviewed the patient's H&P dated: There are no significant changes.     Ready For Surgery From Anesthesia Perspective.     .

## 2023-04-21 NOTE — H&P
[facility]  General Surgery  History & Physical    Patient Name: Macho Diggs  MRN: [id number]  Admission Date: (Not on file)  Attending Physician: Jacob Griffith MD   Primary Care Provider: Seun Dodge NP    Patient information was obtained from patient and past medical records.     Subjective:     Chief Complaint/Reason for Admission:  Left inguinal hernia    History of Present Illness:  Patient is a 67 y.o. male who was working out and then noticed mild discomfort in the left inguinal area.  He was examined and found to have a hernia, referred to us and now being admitted for repair.  This is the 1st time the patient is having this problem.  Denies any previous surgery in the area and denies any incarceration episodes.    No current facility-administered medications on file prior to encounter.     Current Outpatient Medications on File Prior to Encounter   Medication Sig    amlodipine (NORVASC) 10 MG tablet Take 1 tablet (10 mg total) by mouth once daily.    atorvastatin (LIPITOR) 10 MG tablet Take 10 mg by mouth once daily.    BYSTOLIC 10 mg Tab Take 10 mg by mouth once daily. NOT TAKING    ciclopirox (LOPROX) 0.77 % Crea Apply a small amount to affected area twice a day PRN JOCK ITCH    desonide 0.05% (DESOWEN) 0.05 % Oint apply a small amount to affected area twice a day as needed for rash    gabapentin (NEURONTIN) 300 MG capsule Take 1 capsule (300 mg total) by mouth 2 (two) times daily.    hydrochlorothiazide (HYDRODIURIL) 25 MG tablet Take 1 tablet (25 mg total) by mouth once daily.    naproxen (NAPROSYN) 500 MG tablet Take 1 tablet (500 mg total) by mouth every 12 (twelve) hours as needed (Pain). (Patient not taking: Reported on 4/17/2023)    pantoprazole (PROTONIX) 40 MG tablet Take 1 tablet (40 mg total) by mouth once daily.    polyethylene glycol (GLYCOLAX) 17 gram/dose powder Take 17 g by mouth daily as needed (constipation).    psyllium 0.52 gram capsule Take 1 capsule (0.52 g total) by  mouth once daily. (Patient not taking: Reported on 2023)    sennosides-docusate sodium 8.6-50 mg Cap Take 2 capsules by mouth 2 (two) times daily as needed (Constipation). (Patient not taking: Reported on 2023)    sucralfate (CARAFATE) 1 gram tablet Take 1 tablet (1 g total) by mouth 4 (four) times daily before meals and nightly. (Patient not taking: Reported on 2023)       Review of patient's allergies indicates:  No Known Allergies    Past Medical History:   Diagnosis Date    Carpal tunnel syndrome     GERD (gastroesophageal reflux disease)     Heart murmur     Hyperlipidemia     Hypertension     Left inguinal hernia 2023     Past Surgical History:   Procedure Laterality Date    COLONOSCOPY N/A 10/14/2016    Procedure: COLONOSCOPY;  Surgeon: Elizabet Jorgensen MD;  Location: [facility];  Service: Endoscopy;  Laterality: N/A;    COLONOSCOPY N/A 2021    Procedure: COLONOSCOPY;  Surgeon: Nona Tellez MD;  Location: [facility];  Service: General;  Laterality: N/A;    ESOPHAGOGASTRODUODENOSCOPY N/A 2019    Procedure: ESOPHAGOGASTRODUODENOSCOPY (EGD);  Surgeon: Jocelyn Jorgensen MD;  Location: [facility];  Service: Endoscopy;  Laterality: N/A;    HERNIA REPAIR      TONSILLECTOMY       Family History       Problem Relation (Age of Onset)    Breast cancer Mother    Cancer Brother    Colon cancer Father (84)    Heart disease Mother    Hypertension Brother    Stroke Mother (77)          Tobacco Use    Smoking status: Former     Packs/day: 0.20     Years: 15.00     Pack years: 3.00     Types: Cigarettes     Start date: 10/12/1974     Quit date: 1985     Years since quittin.4    Smokeless tobacco: Never   Substance and Sexual Activity    Alcohol use: Yes     Comment: 6*16 oz beers daily     Drug use: Yes     Types: Marijuana     Comment: LAST USED 2023    Sexual activity: Yes     Partners: Female     Comment:      Review of Systems   Gastrointestinal:  Negative for  abdominal distention, abdominal pain and anal bleeding.        History of left inguinal hernia   All other systems reviewed and are negative.  Objective:     Vital Signs (Most Recent):    Vital Signs (24h Range):           There is no height or weight on file to calculate BMI.    Physical Exam  Constitutional:       Appearance: Normal appearance.   HENT:      Head: Normocephalic.      Nose: Nose normal.      Mouth/Throat:      Mouth: Mucous membranes are moist.   Eyes:      Extraocular Movements: Extraocular movements intact.   Cardiovascular:      Rate and Rhythm: Normal rate and regular rhythm.   Pulmonary:      Breath sounds: Normal breath sounds.   Abdominal:      General: Abdomen is flat. There is no distension.      Palpations: Abdomen is soft. There is no mass.      Tenderness: There is no abdominal tenderness.      Hernia: A hernia (Reducible left inguinal hernia.  There is none on the right) is present.   Musculoskeletal:         General: Normal range of motion.      Cervical back: Neck supple.   Lymphadenopathy:      Cervical: No cervical adenopathy.   Skin:     General: Skin is warm and dry.      Coloration: Skin is not jaundiced.   Neurological:      General: No focal deficit present.      Mental Status: He is alert and oriented to person, place, and time.   Psychiatric:         Mood and Affect: Mood normal.         Behavior: Behavior normal.       Significant Labs:  I have reviewed all pertinent lab results within the past 24 hours.    Significant Diagnostics:  I have reviewed all pertinent imaging results/findings within the past 24 hours.    Assessment/Plan:     Active Diagnoses:    Diagnosis Date Noted POA    PRINCIPAL PROBLEM:  Left inguinal hernia [K40.90] 04/2023 Yes      Problems Resolved During this Admission:     VTE Risk Mitigation (From admission, onward)      None            Jacob Griffith MD  General Surgery  [facility]

## 2023-04-24 ENCOUNTER — HOSPITAL ENCOUNTER (OUTPATIENT)
Facility: HOSPITAL | Age: 68
Discharge: HOME | End: 2023-04-24
Attending: SURGERY | Admitting: SURGERY
Payer: MEDICARE

## 2023-04-24 ENCOUNTER — ANESTHESIA (OUTPATIENT)
Dept: SURGERY | Facility: HOSPITAL | Age: 68
End: 2023-04-24
Payer: MEDICARE

## 2023-04-24 VITALS
OXYGEN SATURATION: 97 % | RESPIRATION RATE: 20 BRPM | TEMPERATURE: 98 F | DIASTOLIC BLOOD PRESSURE: 78 MMHG | SYSTOLIC BLOOD PRESSURE: 151 MMHG | HEART RATE: 56 BPM

## 2023-04-24 DIAGNOSIS — K40.90 LEFT INGUINAL HERNIA: Primary | ICD-10-CM

## 2023-04-24 DIAGNOSIS — K40.90 NON-RECURRENT UNILATERAL INGUINAL HERNIA WITHOUT OBSTRUCTION OR GANGRENE: ICD-10-CM

## 2023-04-24 PROCEDURE — 71000033 HC RECOVERY, INTIAL HOUR: Performed by: SURGERY

## 2023-04-24 PROCEDURE — 37000009 HC ANESTHESIA EA ADD 15 MINS: Performed by: SURGERY

## 2023-04-24 PROCEDURE — 37000008 HC ANESTHESIA 1ST 15 MINUTES: Performed by: SURGERY

## 2023-04-24 PROCEDURE — 36000706: Performed by: SURGERY

## 2023-04-24 PROCEDURE — 25000003 PHARM REV CODE 250: Performed by: SURGERY

## 2023-04-24 PROCEDURE — C1781 MESH (IMPLANTABLE): HCPCS | Performed by: SURGERY

## 2023-04-24 PROCEDURE — 71000016 HC POSTOP RECOV ADDL HR: Performed by: SURGERY

## 2023-04-24 PROCEDURE — 71000015 HC POSTOP RECOV 1ST HR: Performed by: SURGERY

## 2023-04-24 PROCEDURE — 63600175 PHARM REV CODE 636 W HCPCS: Performed by: NURSE ANESTHETIST, CERTIFIED REGISTERED

## 2023-04-24 PROCEDURE — 36000707: Performed by: SURGERY

## 2023-04-24 PROCEDURE — 27201423 OPTIME MED/SURG SUP & DEVICES STERILE SUPPLY: Performed by: SURGERY

## 2023-04-24 PROCEDURE — 25000003 PHARM REV CODE 250: Performed by: NURSE ANESTHETIST, CERTIFIED REGISTERED

## 2023-04-24 PROCEDURE — 63600175 PHARM REV CODE 636 W HCPCS: Performed by: SURGERY

## 2023-04-24 RX ORDER — HYDROMORPHONE HYDROCHLORIDE 1 MG/ML
0.5 INJECTION, SOLUTION INTRAMUSCULAR; INTRAVENOUS; SUBCUTANEOUS EVERY 5 MIN PRN
Status: DISCONTINUED | OUTPATIENT
Start: 2023-04-24 | End: 2023-04-24 | Stop reason: HOSPADM

## 2023-04-24 RX ORDER — SODIUM CHLORIDE 9 MG/ML
INJECTION, SOLUTION INTRAVENOUS CONTINUOUS
Status: DISCONTINUED | OUTPATIENT
Start: 2023-04-24 | End: 2023-04-24 | Stop reason: HOSPADM

## 2023-04-24 RX ORDER — DIPHENHYDRAMINE HYDROCHLORIDE 50 MG/ML
25 INJECTION INTRAMUSCULAR; INTRAVENOUS EVERY 6 HOURS PRN
Status: DISCONTINUED | OUTPATIENT
Start: 2023-04-24 | End: 2023-04-24

## 2023-04-24 RX ORDER — ONDANSETRON HYDROCHLORIDE 2 MG/ML
INJECTION, SOLUTION INTRAMUSCULAR; INTRAVENOUS
Status: DISCONTINUED | OUTPATIENT
Start: 2023-04-24 | End: 2023-04-24

## 2023-04-24 RX ORDER — MORPHINE SULFATE 4 MG/ML
4 INJECTION, SOLUTION INTRAMUSCULAR; INTRAVENOUS EVERY 5 MIN PRN
Status: DISCONTINUED | OUTPATIENT
Start: 2023-04-24 | End: 2023-04-24

## 2023-04-24 RX ORDER — ONDANSETRON 2 MG/ML
8 INJECTION INTRAMUSCULAR; INTRAVENOUS ONCE
Status: COMPLETED | OUTPATIENT
Start: 2023-04-24 | End: 2023-04-24

## 2023-04-24 RX ORDER — CEFAZOLIN SODIUM 1 G/50ML
1 SOLUTION INTRAVENOUS ONCE
Status: COMPLETED | OUTPATIENT
Start: 2023-04-24 | End: 2023-04-24

## 2023-04-24 RX ORDER — MORPHINE SULFATE 4 MG/ML
4 INJECTION, SOLUTION INTRAMUSCULAR; INTRAVENOUS EVERY 5 MIN PRN
Status: DISCONTINUED | OUTPATIENT
Start: 2023-04-24 | End: 2023-04-24 | Stop reason: HOSPADM

## 2023-04-24 RX ORDER — ACETAMINOPHEN 10 MG/ML
INJECTION, SOLUTION INTRAVENOUS
Status: DISCONTINUED | OUTPATIENT
Start: 2023-04-24 | End: 2023-04-24

## 2023-04-24 RX ORDER — HYDROCODONE BITARTRATE AND ACETAMINOPHEN 7.5; 325 MG/1; MG/1
1 TABLET ORAL EVERY 6 HOURS PRN
Status: DISCONTINUED | OUTPATIENT
Start: 2023-04-24 | End: 2023-04-24 | Stop reason: HOSPADM

## 2023-04-24 RX ORDER — MIDAZOLAM HYDROCHLORIDE 1 MG/ML
INJECTION INTRAMUSCULAR; INTRAVENOUS
Status: DISCONTINUED | OUTPATIENT
Start: 2023-04-24 | End: 2023-04-24

## 2023-04-24 RX ORDER — EPHEDRINE SULFATE 50 MG/ML
INJECTION, SOLUTION INTRAVENOUS
Status: DISCONTINUED | OUTPATIENT
Start: 2023-04-24 | End: 2023-04-24

## 2023-04-24 RX ORDER — LIDOCAINE HYDROCHLORIDE 10 MG/ML
1 INJECTION, SOLUTION EPIDURAL; INFILTRATION; INTRACAUDAL; PERINEURAL ONCE AS NEEDED
Status: DISCONTINUED | OUTPATIENT
Start: 2023-04-24 | End: 2023-04-24 | Stop reason: HOSPADM

## 2023-04-24 RX ORDER — PROPOFOL 10 MG/ML
VIAL (ML) INTRAVENOUS
Status: DISCONTINUED | OUTPATIENT
Start: 2023-04-24 | End: 2023-04-24

## 2023-04-24 RX ORDER — GLYCOPYRROLATE 0.2 MG/ML
INJECTION, SOLUTION INTRAMUSCULAR; INTRAVENOUS
Status: DISCONTINUED | OUTPATIENT
Start: 2023-04-24 | End: 2023-04-24

## 2023-04-24 RX ORDER — FENTANYL CITRATE 50 UG/ML
INJECTION, SOLUTION INTRAMUSCULAR; INTRAVENOUS
Status: DISCONTINUED | OUTPATIENT
Start: 2023-04-24 | End: 2023-04-24

## 2023-04-24 RX ORDER — HYDROMORPHONE HYDROCHLORIDE 1 MG/ML
0.5 INJECTION, SOLUTION INTRAMUSCULAR; INTRAVENOUS; SUBCUTANEOUS EVERY 5 MIN PRN
Status: DISCONTINUED | OUTPATIENT
Start: 2023-04-24 | End: 2023-04-24

## 2023-04-24 RX ORDER — AMIKACIN SULFATE 250 MG/ML
INJECTION, SOLUTION INTRAMUSCULAR; INTRAVENOUS
Status: DISCONTINUED | OUTPATIENT
Start: 2023-04-24 | End: 2023-04-24 | Stop reason: HOSPADM

## 2023-04-24 RX ORDER — SODIUM CHLORIDE 0.9 % (FLUSH) 0.9 %
10 SYRINGE (ML) INJECTION
Status: DISCONTINUED | OUTPATIENT
Start: 2023-04-24 | End: 2023-04-24 | Stop reason: HOSPADM

## 2023-04-24 RX ORDER — HYDROCODONE BITARTRATE AND ACETAMINOPHEN 5; 325 MG/1; MG/1
1 TABLET ORAL EVERY 4 HOURS PRN
Qty: 25 TABLET | Refills: 0 | Status: SHIPPED | OUTPATIENT
Start: 2023-04-24

## 2023-04-24 RX ORDER — ROCURONIUM BROMIDE 10 MG/ML
INJECTION, SOLUTION INTRAVENOUS
Status: DISCONTINUED | OUTPATIENT
Start: 2023-04-24 | End: 2023-04-24

## 2023-04-24 RX ORDER — ONDANSETRON 2 MG/ML
4 INJECTION INTRAMUSCULAR; INTRAVENOUS DAILY PRN
Status: DISCONTINUED | OUTPATIENT
Start: 2023-04-24 | End: 2023-04-24 | Stop reason: HOSPADM

## 2023-04-24 RX ORDER — NEOSTIGMINE METHYLSULFATE 5 MG/5 ML
SYRINGE (ML) INTRAVENOUS
Status: DISCONTINUED | OUTPATIENT
Start: 2023-04-24 | End: 2023-04-24

## 2023-04-24 RX ORDER — DIPHENHYDRAMINE HYDROCHLORIDE 50 MG/ML
25 INJECTION INTRAMUSCULAR; INTRAVENOUS EVERY 6 HOURS PRN
Status: DISCONTINUED | OUTPATIENT
Start: 2023-04-24 | End: 2023-04-24 | Stop reason: HOSPADM

## 2023-04-24 RX ORDER — LIDOCAINE HYDROCHLORIDE 10 MG/ML
INJECTION, SOLUTION INTRAVENOUS
Status: DISCONTINUED | OUTPATIENT
Start: 2023-04-24 | End: 2023-04-24

## 2023-04-24 RX ORDER — ONDANSETRON 2 MG/ML
4 INJECTION INTRAMUSCULAR; INTRAVENOUS DAILY PRN
Status: DISCONTINUED | OUTPATIENT
Start: 2023-04-24 | End: 2023-04-24

## 2023-04-24 RX ORDER — BUPIVACAINE HYDROCHLORIDE AND EPINEPHRINE 2.5; 5 MG/ML; UG/ML
INJECTION, SOLUTION EPIDURAL; INFILTRATION; INTRACAUDAL; PERINEURAL
Status: DISCONTINUED | OUTPATIENT
Start: 2023-04-24 | End: 2023-04-24 | Stop reason: HOSPADM

## 2023-04-24 RX ORDER — AMOXICILLIN AND CLAVULANATE POTASSIUM 875; 125 MG/1; MG/1
1 TABLET, FILM COATED ORAL 2 TIMES DAILY
Qty: 9 TABLET | Refills: 0 | Status: SHIPPED | OUTPATIENT
Start: 2023-04-24

## 2023-04-24 RX ADMIN — FENTANYL CITRATE 50 MCG: 50 INJECTION INTRAMUSCULAR; INTRAVENOUS at 01:04

## 2023-04-24 RX ADMIN — GLYCOPYRROLATE 0.6 MG: 0.2 INJECTION, SOLUTION INTRAMUSCULAR; INTRAVENOUS at 01:04

## 2023-04-24 RX ADMIN — SODIUM CHLORIDE: 9 INJECTION, SOLUTION INTRAVENOUS at 08:04

## 2023-04-24 RX ADMIN — SODIUM CHLORIDE: 9 INJECTION, SOLUTION INTRAVENOUS at 01:04

## 2023-04-24 RX ADMIN — ROCURONIUM BROMIDE 40 MG: 10 INJECTION, SOLUTION INTRAVENOUS at 12:04

## 2023-04-24 RX ADMIN — ONDANSETRON HYDROCHLORIDE 8 MG: 2 SOLUTION INTRAMUSCULAR; INTRAVENOUS at 02:04

## 2023-04-24 RX ADMIN — FENTANYL CITRATE 50 MCG: 50 INJECTION INTRAMUSCULAR; INTRAVENOUS at 12:04

## 2023-04-24 RX ADMIN — CEFAZOLIN SODIUM 1 G: 1 SOLUTION INTRAVENOUS at 03:04

## 2023-04-24 RX ADMIN — FENTANYL CITRATE 100 MCG: 50 INJECTION INTRAMUSCULAR; INTRAVENOUS at 12:04

## 2023-04-24 RX ADMIN — ACETAMINOPHEN 1000 MG: 10 INJECTION, SOLUTION INTRAVENOUS at 01:04

## 2023-04-24 RX ADMIN — Medication 200 MG: at 12:04

## 2023-04-24 RX ADMIN — Medication 5 MG: at 01:04

## 2023-04-24 RX ADMIN — ONDANSETRON 4 MG: 2 INJECTION INTRAMUSCULAR; INTRAVENOUS at 12:04

## 2023-04-24 RX ADMIN — ONDANSETRON 8 MG: 2 INJECTION INTRAMUSCULAR; INTRAVENOUS at 03:04

## 2023-04-24 RX ADMIN — EPHEDRINE SULFATE 15 MG: 50 INJECTION, SOLUTION INTRAVENOUS at 12:04

## 2023-04-24 RX ADMIN — MIDAZOLAM 2 MG: 1 INJECTION INTRAMUSCULAR; INTRAVENOUS at 12:04

## 2023-04-24 RX ADMIN — LIDOCAINE HYDROCHLORIDE 50 MG: 10 INJECTION, SOLUTION INTRAVENOUS at 12:04

## 2023-04-24 RX ADMIN — CEFAZOLIN 2 G: 2 INJECTION, POWDER, FOR SOLUTION INTRAMUSCULAR; INTRAVENOUS at 11:04

## 2023-04-24 NOTE — DISCHARGE SUMMARY
[place] - Surgery  Discharge Note  Short Stay    Procedure(s) (LRB):  REPAIR, HERNIA, INGUINAL, WITHOUT HISTORY OF PRIOR REPAIR, AGE 6 YEARS OR OLDER with mesh (Left)      OUTCOME: Patient tolerated treatment/procedure well without complication and is now ready for discharge.    DISPOSITION: Home or Self Care    FINAL DIAGNOSIS:  1. Left inguinal hernia    FOLLOWUP:  Patient is to follow up in clinic in 1 week for staple removal    DISCHARGE INSTRUCTIONS:    Discharge Procedure Orders   Diet Adult Regular     Ice to affected area     Lifting restrictions   Order Comments: No lifting     No driving until:   Order Comments: No driving until cleared by me     Notify your health care provider if you experience any of the following:  temperature >100.4     Notify your health care provider if you experience any of the following:  persistent nausea and vomiting or diarrhea     Notify your health care provider if you experience any of the following:  severe uncontrolled pain     Notify your health care provider if you experience any of the following:  redness, tenderness, or signs of infection (pain, swelling, redness, odor or green/yellow discharge around incision site)     Activity as tolerated        TIME SPENT ON DISCHARGE:  15 minutes

## 2023-04-24 NOTE — INTERVAL H&P NOTE
The patient has been examined and the H&P has been reviewed:    I concur with the findings and no changes have occurred since H&P was written.    Surgery risks, benefits and alternative options discussed and understood by patient/family.          Active Hospital Problems    Diagnosis  POA    *Left inguinal hernia [K40.90]  Yes      Resolved Hospital Problems   No resolved problems to display.

## 2023-04-24 NOTE — OP NOTE
[place] - Surgery  Operative Note      Date of Procedure: 4/24/2023     Procedure: Procedure(s) (LRB):  REPAIR, HERNIA, INGUINAL, WITHOUT HISTORY OF PRIOR REPAIR, AGE 6 YEARS OR OLDER with mesh (Left)     Surgeon(s) and Role:     * Jacob Griffith MD - Primary    Assisting Surgeon: None    Pre-Operative Diagnosis: Non-recurrent unilateral inguinal hernia without obstruction or gangrene [K40.90]    Post-Operative Diagnosis: Post-Op Diagnosis Codes:     * Non-recurrent unilateral inguinal hernia without obstruction or gangrene [K40.90]    Anesthesia: Choice    Operative Findings (including complications, if any):  The patient was found to have an indirect inguinal hernia as well as a very weak floor.    Description of Technical Procedures:  Patient was placed in the supine position and after adequate anesthesia level was obtained he was then prepped and draped.  We infiltrated the incision site with 0.5% Marcaine with epinephrine.  We then incised the skin and dissected towards the fascia of the inguinal canal.  Small incision was made on the fascia which we extended then medially and laterally.  The spermatic cord was then elevated.  We carefully dissected the spermatic cord and hernia sac was identified and dissected.  Two transfixion sutures of 2-0 silk were placed and tied and the excess sac amputated.  We then dissected the spermatic cord further looking for a lipoma of the cord but none was found.  We checked then the floor of the canal and found it to be rather weak.  A relaxing incision was then performed and we proceeded with a Do repair.  The inferior edge of the aponeurosis was identified and then brought down to the Jasper's ligament and then the transversalis fascia.  Repair was then done using multiple sutures interrupted of 0 Ethibond.  A transition suture was placed as well using 0 Ethibond.   After these were tied then a ProGrip anatomic mesh was then placed and secured using the Pro Tacker.  Once  that was done and we irrigated, with no active bleeding, we then laid the cord back in the canal and closed the overlying fascia with a running suture of 3 0 Vicryl, Charles's approximated with interrupted 3-0 plain, the dermis with interrupted 4-0 Vicryl and the skin closed with skin staples.  Dressings were applied and the testicle was then pulled down in the scrotum.  The patient was then awakened and taken to recovery room in good condition.    Significant Surgical Tasks Conducted by the Assistant(s), if Applicable:  None    Estimated Blood Loss (EBL): * No values recorded between 4/24/2023 12:34 PM and 4/24/2023  1:58 PM *           Implants:   Implant Name Type Inv. Item Serial No.  Lot No. LRB No. Used Action   self gripping polyester mesh   [id number]   Left 1 Implanted       Specimens:   Specimen (24h ago, onward)      None                    Condition: Good    Disposition: PACU - hemodynamically stable.    Attestation: I performed the procedure.    FINAL DIAGNOSIS:  Left inguinal hernia    DISCHARGE INSTRUCTIONS:    Discharge Procedure Orders   Diet Adult Regular     Ice to affected area     Lifting restrictions   Order Comments: No lifting     No driving until:   Order Comments: No driving until cleared by me     Notify your health care provider if you experience any of the following:  temperature >100.4     Notify your health care provider if you experience any of the following:  persistent nausea and vomiting or diarrhea     Notify your health care provider if you experience any of the following:  severe uncontrolled pain     Notify your health care provider if you experience any of the following:  redness, tenderness, or signs of infection (pain, swelling, redness, odor or green/yellow discharge around incision site)     Activity as tolerated

## 2023-04-24 NOTE — ANESTHESIA PROCEDURE NOTES
Intubation    Date/Time: 4/24/2023 12:25 PM  Performed by: Kj Betancourt CRNA  Authorized by: Kj Betancourt CRNA     Intubation:     Induction:  Intravenous    Intubated:  Postinduction    Mask Ventilation:  Not attempted    Attempts:  1    Attempted By:  CRNA    Method of Intubation:  Direct and bougie    Blade:  Bessie 4    Laryngeal View Grade: Grade I - full view of cords      Difficult Airway Encountered?: No      Complications:  None    Airway Device:  Oral endotracheal tube    Airway Device Size:  7.5    Style/Cuff Inflation:  Cuffed    Tube secured:  21    Secured at:  The lips    Placement Verified By:  Capnometry    Complicating Factors:  None    Findings Post-Intubation:  BS equal bilateral and atraumatic/condition of teeth unchanged

## 2023-04-24 NOTE — DISCHARGE INSTRUCTIONS
NO DRIVIING OR ALCOHOL 24 HOURS AFTER SURGERY  OR WHILE TAKING PAIN MEDICATIONS    FOLLOW UP WITH DR ERWIN ON MON MAY 1 AT 1030  TO REMOVE STAPLES       IN 48 HOURS SHOWER   NO TUB  BATHS   REMOVE DRESSING  CLEAN WITH ANTIBACTERIAL SOAP AND WATER AND KEEP DRY    CLEAN DAILY AFTER THE 48 HOURS    NOTIFY DR ERWIN OF ANY PROBLEMS OR CONCERNS     NO LIFTING GREATER THAN 10 LBS  NO STRAINING  OR STRENUOUS EXERCISE

## 2023-04-24 NOTE — TRANSFER OF CARE
Anesthesia Transfer of Care Note    Patient: Macho Diggs    Procedure(s) Performed: Procedure(s) (LRB):  REPAIR, HERNIA, INGUINAL, WITHOUT HISTORY OF PRIOR REPAIR, AGE 6 YEARS OR OLDER (Left)    Patient location: PACU    Anesthesia Type: general    Transport from OR: Transported from OR on room air with adequate spontaneous ventilation    Post pain: adequate analgesia    Post assessment: no apparent anesthetic complications    Post vital signs: stable    Level of consciousness: awake    Nausea/Vomiting: no nausea/vomiting    Complications: none    Transfer of care protocol was followed      Last vitals:   136/85  16 RR  95 HR  16 RR   37 C TEMP

## 2023-04-25 NOTE — ANESTHESIA POSTPROCEDURE EVALUATION
Anesthesia Post Evaluation    Patient: Macho Diggs    Procedure(s) Performed: Procedure(s) (LRB):  REPAIR, HERNIA, INGUINAL, WITHOUT HISTORY OF PRIOR REPAIR, AGE 6 YEARS OR OLDER with mesh (Left)    Final Anesthesia Type: general      Patient location during evaluation: OPS  Patient participation: Yes- Able to Participate  Level of consciousness: awake  Post-procedure vital signs: reviewed and stable  Pain management: adequate  Airway patency: patent    PONV status at discharge: No PONV  Anesthetic complications: no      Cardiovascular status: blood pressure returned to baseline  Respiratory status: spontaneous ventilation  Hydration status: euvolemic  Follow-up not needed.          Vitals Value Taken Time   /78 04/24/23 1549   Temp 36.9 °C (98.4 °F) 04/24/23 1549   Pulse 56 04/24/23 1549   Resp 20 04/24/23 1549   SpO2 97 % 04/24/23 1549         Event Time   Out of Recovery 14:31:00         Pain/Madeleine Score: Madeleine Score: 10 (4/24/2023  3:51 PM)

## 2024-02-06 ENCOUNTER — HOSPITAL ENCOUNTER (EMERGENCY)
Facility: HOSPITAL | Age: 69
Discharge: HOME | End: 2024-02-06
Attending: EMERGENCY MEDICINE
Payer: MEDICARE

## 2024-02-06 VITALS
BODY MASS INDEX: 22.68 KG/M2 | DIASTOLIC BLOOD PRESSURE: 89 MMHG | OXYGEN SATURATION: 100 % | TEMPERATURE: 98 F | WEIGHT: 162 LBS | HEART RATE: 74 BPM | SYSTOLIC BLOOD PRESSURE: 157 MMHG | RESPIRATION RATE: 14 BRPM | HEIGHT: 71 IN

## 2024-02-06 DIAGNOSIS — M79.671 RIGHT FOOT PAIN: ICD-10-CM

## 2024-02-06 DIAGNOSIS — S93.601A FOOT SPRAIN, RIGHT, INITIAL ENCOUNTER: Primary | ICD-10-CM

## 2024-02-06 PROCEDURE — 99283 EMERGENCY DEPT VISIT LOW MDM: CPT | Mod: 25

## 2024-02-06 RX ORDER — DICLOFENAC SODIUM 75 MG/1
75 TABLET, DELAYED RELEASE ORAL 2 TIMES DAILY
Qty: 10 TABLET | Refills: 0 | Status: SHIPPED | OUTPATIENT
Start: 2024-02-06 | End: 2024-02-11

## 2024-02-06 NOTE — DISCHARGE INSTRUCTIONS
There were no obvious broken bones on your x-rays.  You can take the diclofenac twice a day as needed for pain.  Elevate your legs when you can.  Use Ace wrap for support.  Ice your foot for 15 minutes at a time.  Follow up with primary care if symptoms do not improve.

## 2024-02-06 NOTE — Clinical Note
"Macho Saul (David)re was seen and treated in our emergency department on 2/6/2024.  He may return to work on 02/12/2024.       If you have any questions or concerns, please don't hesitate to call.      Wily Abdullahi, NP"

## 2024-02-06 NOTE — ED PROVIDER NOTES
Encounter Date: 2/6/2024       History     Chief Complaint   Patient presents with    Foot Pain     Pt c/o right lateral foot, onset yesterday.  Pt denies injury     68-year-old male with history of GERD, hypertension, hyperlipidemia presents to ED with complaints of right foot pain that started yesterday.  Denies any injury or trauma.  Symptoms are constant.  Tylenol without any improvement in symptoms.  Pain worse with walking.  No relieving symptoms.  No radiation in pain.    The history is provided by the patient.     Review of patient's allergies indicates:  No Known Allergies  Past Medical History:   Diagnosis Date    Carpal tunnel syndrome     GERD (gastroesophageal reflux disease)     Heart murmur     Hyperlipidemia     Hypertension     Left inguinal hernia 04/2023     Past Surgical History:   Procedure Laterality Date    COLONOSCOPY N/A 10/14/2016    Procedure: COLONOSCOPY;  Surgeon: Elizabet Jorgensen MD;  Location: [facility];  Service: Endoscopy;  Laterality: N/A;    COLONOSCOPY N/A 12/14/2021    Procedure: COLONOSCOPY;  Surgeon: Nona Tellez MD;  Location: [facility];  Service: General;  Laterality: N/A;    ESOPHAGOGASTRODUODENOSCOPY N/A 7/18/2019    Procedure: ESOPHAGOGASTRODUODENOSCOPY (EGD);  Surgeon: Jocelyn Jorgensen MD;  Location: [facility];  Service: Endoscopy;  Laterality: N/A;    HERNIA REPAIR      REPAIR, HERNIA, INGUINAL, WITHOUT HISTORY OF PRIOR REPAIR, AGE 6 YEARS OR OLDER Left 4/24/2023    Procedure: REPAIR, HERNIA, INGUINAL, WITHOUT HISTORY OF PRIOR REPAIR, AGE 6 YEARS OR OLDER with mesh;  Surgeon: Jacob Griffith MD;  Location: [facility];  Service: General;  Laterality: Left;  4th 0800    TONSILLECTOMY       Family History   Problem Relation Age of Onset    Breast cancer Mother     Stroke Mother 77    Heart disease Mother         PACEMAKER    Colon cancer Father 84    Cancer Brother     Hypertension Brother      Social History     Tobacco Use    Smoking status: Former     Current  packs/day: 0.00     Average packs/day: 0.2 packs/day for 15.0 years (3.0 ttl pk-yrs)     Types: Cigarettes     Start date: 10/12/1974     Quit date: 1985     Years since quittin.2    Smokeless tobacco: Never   Substance Use Topics    Alcohol use: Yes     Comment: 6*16 oz beers daily     Drug use: Yes     Types: Marijuana     Review of Systems   Constitutional:  Negative for fever.   HENT:  Negative for sore throat.    Eyes: Negative.    Respiratory:  Negative for shortness of breath.    Cardiovascular:  Negative for chest pain.   Gastrointestinal:  Negative for nausea.   Endocrine: Negative.    Genitourinary:  Negative for dysuria.   Musculoskeletal:  Negative for back pain.        Right foot pain   Skin:  Negative for rash.   Allergic/Immunologic: Negative.    Neurological:  Negative for weakness.   Hematological:  Does not bruise/bleed easily.   Psychiatric/Behavioral: Negative.         Physical Exam     Initial Vitals [24 0745]   BP Pulse Resp Temp SpO2   (!) 157/89 74 14 98 °F (36.7 °C) 100 %      MAP       --         Physical Exam    Nursing note and vitals reviewed.  Constitutional: He appears well-developed and well-nourished.   HENT:   Head: Normocephalic and atraumatic.   Eyes: EOM are normal.   Neck: Neck supple.   Normal range of motion.  Cardiovascular:  Normal rate and regular rhythm.           Pulmonary/Chest: No respiratory distress.   Abdominal: He exhibits no distension.   Musculoskeletal:         General: Normal range of motion.      Cervical back: Normal range of motion and neck supple.      Right foot: Normal range of motion and normal capillary refill. Swelling and bony tenderness present. No deformity. Normal pulse.     Neurological: He is alert and oriented to person, place, and time.   Skin: Skin is warm and dry.   Psychiatric: He has a normal mood and affect. Thought content normal.         ED Course   Procedures  Labs Reviewed - No data to display       Imaging Results               X-Ray Foot Complete Right (Final result)  Result time 02/06/24 10:01:07      Final result by Cinthia Mcconnell MD (02/06/24 10:01:07)                   Impression:      Significant degenerative changes of the 1st metatarsal phalangeal joint and calcaneal osteophytes.  No acute fracture.      Electronically signed by: Cinthia Mcconnell MD  Date:    02/06/2024  Time:    10:01               Narrative:    EXAMINATION:  XR FOOT COMPLETE 3 VIEW RIGHT    CLINICAL HISTORY:  Pain in right foot    COMPARISON:  None.    FINDINGS:  Significant degenerative changes of the 1st metatarsal phalangeal joint with bony proliferation.  Calcaneal osteophytes.  No acute fracture.                                       Medications - No data to display  Medical Decision Making  68-year-old male to ED for above complaints.  Did have some swelling and tenderness noted to right foot at base of 5th metatarsal.  X-rays obtained and negative for acute fractures or dislocations.  No erythema noted.  He was neurovascularly intact.  Will Ace wrap provided with diclofenac.  He was instructed on rice principles.  Return precautions given.  Patient to follow up with primary care.    Amount and/or Complexity of Data Reviewed  Radiology: ordered.                                      Clinical Impression:  Final diagnoses:  [M79.671] Right foot pain  [S93.601A] Foot sprain, right, initial encounter (Primary)          ED Disposition Condition    Discharge Stable          ED Prescriptions       Medication Sig Dispense Start Date End Date Auth. Provider    diclofenac (VOLTAREN) 75 MG EC tablet Take 1 tablet (75 mg total) by mouth 2 (two) times daily. for 5 days 10 tablet 2/6/2024 2/11/2024 Wily Abdullahi, NP          Follow-up Information       Follow up With Specialties Details Why Contact Info Additional Information    Seun Dodge, FNP Family Medicine   [address] [place] [address] [address] [place] [address]  [phone]       [facility]  Orthopedics   [address], [address]  [facility] [id number] [phone] [address] Psychology, Internal Medicine, Gynecology, Dental General Practice   [address] [place] [id number]  [phone]                Wily Abdullahi NP  02/06/24 8805

## 2024-11-20 DIAGNOSIS — Z13.6 SCREENING FOR AAA (AORTIC ABDOMINAL ANEURYSM): Primary | ICD-10-CM

## 2024-12-06 ENCOUNTER — HOSPITAL ENCOUNTER (OUTPATIENT)
Dept: RADIOLOGY | Facility: HOSPITAL | Age: 69
Discharge: HOME | End: 2024-12-06
Attending: NURSE PRACTITIONER
Payer: MEDICARE

## 2024-12-06 DIAGNOSIS — Z13.6 SCREENING FOR AAA (AORTIC ABDOMINAL ANEURYSM): ICD-10-CM

## 2024-12-06 PROCEDURE — 76775 US EXAM ABDO BACK WALL LIM: CPT | Mod: TC

## (undated) DEVICE — DRAIN PENROSE SIL 0.5X18IN

## (undated) DEVICE — GLOVE BIOGEL ECLIPSE SZ 7.5

## (undated) DEVICE — PAD DERMAPROX LG 11X15X.5IN

## (undated) DEVICE — COVER OVERHEAD SURG LT BLUE

## (undated) DEVICE — GLOVE BIOGEL ECLIPSE SZ 7

## (undated) DEVICE — CONNECTOR TORRENT SCP OLYMPUS

## (undated) DEVICE — BASIN EMESIS GRAPHITE 500ML

## (undated) DEVICE — KIT BIOGUARD AIR WTR SUC VALVE

## (undated) DEVICE — BLADE SURG CARBON STEEL #10

## (undated) DEVICE — SUT 2/0 30IN SILK BLK BRAI

## (undated) DEVICE — TUBE SUC UNIVERSAL .25XIN 6FT

## (undated) DEVICE — DRAPE SURG W/TWL 17 5/8X23

## (undated) DEVICE — SYR 10CC LUER LOCK

## (undated) DEVICE — SUT GUT PL 3-0 CT 852H

## (undated) DEVICE — TRAY BASIC LAPAROTOMY 1

## (undated) DEVICE — NDL ECLIPSE SAFETY 18GX1-1/2IN

## (undated) DEVICE — UNDERPAD DISPOSABLE 30X30IN

## (undated) DEVICE — CAUTERY PUSHBUTTON PENCIL

## (undated) DEVICE — CLIPPER BLADE MOD 4406 (CAREF)

## (undated) DEVICE — ELECTRODE REM PLYHSV RETURN 9

## (undated) DEVICE — BITE BLOCK ADULT JUMBO ENDO W/

## (undated) DEVICE — CLEANER CAUT TIP STRL 2X2IN

## (undated) DEVICE — NDL 27G X 1 1/4

## (undated) DEVICE — STRAP KNEE & BODY DISP 4X34IN

## (undated) DEVICE — SYR IRRIGATION BULB STER 60ML

## (undated) DEVICE — TOWEL OR XRAY WHITE 17X26IN

## (undated) DEVICE — SPONGE POST-OP GAUZE 4X4IN

## (undated) DEVICE — SUT ETHIBOND 0 CR/MO-7 8-18

## (undated) DEVICE — STAPLER SKIN PROXIMATE WIDE

## (undated) DEVICE — SPONGE LAP 18X18 PREWASHED

## (undated) DEVICE — BLANKET UPPER BODY 78.7X29.9IN

## (undated) DEVICE — SPONGE KITTNER 1/4X 5/8 L STRL

## (undated) DEVICE — SPONGE COVER CURITY 4 X 4 LF

## (undated) DEVICE — CANNULA SUPERSOFT CO2 M AD 7FT

## (undated) DEVICE — SKIN MARKER STER DUAL TIP

## (undated) DEVICE — STAPLER INT HERNIA PROTACK 5MM

## (undated) DEVICE — GOWN SURGICAL BRTHBL XL

## (undated) DEVICE — COUNTER NDL DBL MAG 100

## (undated) DEVICE — FORCEP ENDOJAW OVL NDL 2X1550

## (undated) DEVICE — SUT J218H VICRYL 4-0

## (undated) DEVICE — SUT 3-0 VICRYL / SH (J416)

## (undated) DEVICE — SNARE SNAREMASTER OVAL 25MM

## (undated) DEVICE — ELECTRODE FOAM 535 TEARDROP

## (undated) DEVICE — TUBING OXYGEN CONNECT BUBBLE

## (undated) DEVICE — BLADE SURG #15 CARBON STEEL

## (undated) DEVICE — TIP YANKAUERS BULB NO VENT

## (undated) DEVICE — LINER SUCTION CANNISTER REGUGA

## (undated) DEVICE — SYS AQUASHIELD WATTER BOTTLE

## (undated) DEVICE — SPONGE GAUZE 16PLY 4X4

## (undated) DEVICE — SPONGE DRY VIA GREEN

## (undated) DEVICE — SOL IRRI STRL WATER 1000ML

## (undated) DEVICE — APPLICATOR CHLORAPREP ORN 26ML

## (undated) DEVICE — UNDERGLOVES BIOGEL PI SZ 7 LF

## (undated) DEVICE — KIT VIA CUSTOM PROCEDURE